# Patient Record
Sex: MALE | Race: WHITE | NOT HISPANIC OR LATINO | Employment: FULL TIME | URBAN - METROPOLITAN AREA
[De-identification: names, ages, dates, MRNs, and addresses within clinical notes are randomized per-mention and may not be internally consistent; named-entity substitution may affect disease eponyms.]

---

## 2022-04-21 ENCOUNTER — OFFICE VISIT (OUTPATIENT)
Dept: CARDIOLOGY | Facility: CLINIC | Age: 22
End: 2022-04-21

## 2022-04-21 VITALS
DIASTOLIC BLOOD PRESSURE: 64 MMHG | OXYGEN SATURATION: 96 % | SYSTOLIC BLOOD PRESSURE: 99 MMHG | HEART RATE: 56 BPM | HEIGHT: 71 IN | BODY MASS INDEX: 37.8 KG/M2 | WEIGHT: 270 LBS

## 2022-04-21 DIAGNOSIS — R55 SYNCOPE AND COLLAPSE: Primary | ICD-10-CM

## 2022-04-21 PROCEDURE — 93000 ELECTROCARDIOGRAM COMPLETE: CPT | Performed by: INTERNAL MEDICINE

## 2022-04-21 PROCEDURE — 99204 OFFICE O/P NEW MOD 45 MIN: CPT | Performed by: INTERNAL MEDICINE

## 2022-04-21 NOTE — PROGRESS NOTES
Encounter Date:04/21/2022      Patient ID: Kurtis Poon is a 21 y.o. male.    Chief Complaint:  Syncope      History of Present Illness  The patient is a pleasant 21-year-old white male presented to the office with history of syncope.  Patient apparently approximately 1 year ago while he was standing in line at department store had syncopal episode starting with lightheadedness.  Patient's bystanders thought he may be having seizure activity with jerking motion while he was on the floor.  EMS were called and was taken to ER and was found to have bradycardia and apparently had 8-second period of asystole in the ER.  Patient apparently has been doing fine since then until recently on 4/3/2022 he had another episode while he was in the restaurant feeling fine and apparently had chipped molar and had terrible pain.  He had similar episode.  Patient was taken to the ER and no specific etiology was found.  Patient did not have any further episodes.    The patient has been without any chest discomfort ,shortness of breath, palpitations, dizziness.  Denies having any headache ,abdominal pain ,nausea, vomiting , diarrhea constipation, loss of weight or loss of appetite.  Denies having any excessive bruising ,hematuria or blood in the stool.    Review of all systems negative except as indicated.    Reviewed ROS.      Assessment and Plan     ]]]]]]]]]]]]]]]]]]]]]  Impression  ===========  - Syncope.  Apparently had 8-second pause while he was in the ER first time.  Patient apparently had seizure activity with first episode.  Possible cardiac seizure.  First episode was approximately a year ago and second episode for 4/3/2022 with following description.    Patient apparently approximately 1 year ago while he was standing in line at department store had syncopal episode starting with lightheadedness.  Patient's bystanders thought he may be having seizure activity with jerking motion while he was on the floor.  EMS were called  and was taken to ER and was found to have bradycardia and apparently had 8-second period of asystole in the ER.  Patient apparently has been doing fine since then until recently on 4/3/2022 he had another episode while he was in the restaurant feeling fine and apparently had chipped molar and had terrible pain.  He had similar episode.  Patient was taken to the ER and no specific etiology was found.  Patient did not have any further episodes.    - Status post hernia repair tubes in the ears    -Family history of coronary artery disease    - Smoker (will review with patient when he returns since he is listed as non-smoker)    - No known allergies  ============  Plan  ===========  Patient has history of syncope.  EKG showed sinus bradycardia.  Patient apparently had a history 8-second pause with the first episode while he was in the ER.  Concerning for cardiac seizure.  Second episode probably vasovagal associated with chipping of molar.  Apparently very painful.  Echocardiogram  30-day event monitor.  Patient may benefit from tilt table test.  Patient may benefit from loop recorder placement if no definite etiology is found with 30-day event monitor.  Follow-up in the office on the same day.  Further plan will depend on patient's progress.  ]]]]]]]]]]]]]]]]]]           Diagnosis Plan   1. Syncope and collapse  Mobile Cardiac Outpatient Telemetry    Adult Transthoracic Echo Complete W/ Cont if Necessary Per Protocol   LAB RESULTS (LAST 7 DAYS)    CBC        BMP        CMP         BNP        TROPONIN        CoAg        Creatinine Clearance  CrCl cannot be calculated (No successful lab value found.).    ABG        Radiology  No radiology results for the last day                The following portions of the patient's history were reviewed and updated as appropriate: allergies, current medications, past family history, past medical history, past social history, past surgical history and problem list.    Review of Systems    Constitutional: Negative for malaise/fatigue.   Cardiovascular: Negative for chest pain, leg swelling, palpitations and syncope.   Respiratory: Negative for shortness of breath.    Skin: Negative for rash.   Gastrointestinal: Negative for nausea and vomiting.   Neurological: Positive for light-headedness. Negative for dizziness and numbness.   All other systems reviewed and are negative.      No current outpatient medications on file.    No Known Allergies    Family History   Problem Relation Age of Onset   • Heart disease Father    • Hypertension Maternal Grandmother    • Heart failure Maternal Grandmother    • Heart disease Maternal Grandmother    • Diabetes type I Maternal Grandmother    • Stroke Maternal Grandmother    • Hypertension Maternal Grandfather    • Diabetes type II Maternal Grandfather    • Diabetes type II Paternal Grandmother    • Hypertension Paternal Grandmother    • Heart disease Paternal Grandmother    • Diabetes type II Paternal Grandfather    • Hypertension Paternal Grandfather        Past Surgical History:   Procedure Laterality Date   • EAR TUBES     • HERNIA REPAIR     • TEAR DUCT SURGERY         History reviewed. No pertinent past medical history.    Family History   Problem Relation Age of Onset   • Heart disease Father    • Hypertension Maternal Grandmother    • Heart failure Maternal Grandmother    • Heart disease Maternal Grandmother    • Diabetes type I Maternal Grandmother    • Stroke Maternal Grandmother    • Hypertension Maternal Grandfather    • Diabetes type II Maternal Grandfather    • Diabetes type II Paternal Grandmother    • Hypertension Paternal Grandmother    • Heart disease Paternal Grandmother    • Diabetes type II Paternal Grandfather    • Hypertension Paternal Grandfather        Social History     Socioeconomic History   • Marital status: Single   Tobacco Use   • Smoking status: Never Smoker   • Smokeless tobacco: Never Used   Vaping Use   • Vaping Use: Every day   •  "Substances: Nicotine, Flavoring   • Devices: [employer] tank   Substance and Sexual Activity   • Alcohol use: Not Currently   • Drug use: Yes     Frequency: 7.0 times per week     Types: Marijuana   • Sexual activity: Defer           ECG 12 Lead    Date/Time: 4/21/2022 3:56 PM  Performed by: Micki Mane MD  Authorized by: Micki Mane MD   Comparison: compared with previous ECG   Comparison to previous ECG: Sinus bradycardia 54/min nonspecific ST-T wave changes right axis deviation no ectopy nonspecific ST-T wave changes.                Objective:       Physical Exam    BP 99/64 (BP Location: Left arm, Patient Position: Sitting, Cuff Size: Large Adult)   Pulse 56   Ht 180.3 cm (71\")   Wt 122 kg (270 lb)   SpO2 96%   BMI 37.66 kg/m²   The patient is alert, oriented and in no distress.    Vital signs as noted above.    Head and neck revealed no carotid bruits or jugular venous distension.  No thyromegaly or lymphadenopathy is present.    Lungs clear.  No wheezing.  Breath sounds are normal bilaterally.    Heart normal first and second heart sounds.  No murmur..  No pericardial rub is present.  No gallop is present.    Abdomen soft and nontender.  No organomegaly is present.    Extremities revealed good peripheral pulses without any pedal edema.    Skin warm and dry.    Musculoskeletal system is grossly normal.    CNS grossly normal.    Reviewed and updated.        "

## 2022-04-29 ENCOUNTER — HOSPITAL ENCOUNTER (OUTPATIENT)
Dept: CARDIOLOGY | Facility: HOSPITAL | Age: 22
Discharge: HOME | End: 2022-04-29
Admitting: INTERNAL MEDICINE

## 2022-04-29 VITALS — WEIGHT: 270 LBS | BODY MASS INDEX: 37.8 KG/M2 | HEIGHT: 71 IN

## 2022-04-29 DIAGNOSIS — R55 SYNCOPE AND COLLAPSE: ICD-10-CM

## 2022-04-29 LAB
BH CV ECHO MEAS - ACS: 1.87 CM
BH CV ECHO MEAS - AO MAX PG: 7.2 MMHG
BH CV ECHO MEAS - AO MEAN PG: 4.4 MMHG
BH CV ECHO MEAS - AO ROOT DIAM: 3.1 CM
BH CV ECHO MEAS - AO V2 MAX: 134.4 CM/SEC
BH CV ECHO MEAS - AO V2 VTI: 34.6 CM
BH CV ECHO MEAS - AVA(I,D): 2.8 CM2
BH CV ECHO MEAS - EDV(CUBED): 94.5 ML
BH CV ECHO MEAS - EDV(MOD-SP4): 114.9 ML
BH CV ECHO MEAS - EF(MOD-SP4): 60.3 %
BH CV ECHO MEAS - ESV(CUBED): 26.6 ML
BH CV ECHO MEAS - ESV(MOD-SP4): 45.6 ML
BH CV ECHO MEAS - FS: 34.5 %
BH CV ECHO MEAS - IVS/LVPW: 0.93 CM
BH CV ECHO MEAS - IVSD: 0.87 CM
BH CV ECHO MEAS - LA DIMENSION: 4.3 CM
BH CV ECHO MEAS - LV MASS(C)D: 135 GRAMS
BH CV ECHO MEAS - LV MAX PG: 5.7 MMHG
BH CV ECHO MEAS - LV MEAN PG: 2.9 MMHG
BH CV ECHO MEAS - LV V1 MAX: 119.5 CM/SEC
BH CV ECHO MEAS - LV V1 VTI: 26.2 CM
BH CV ECHO MEAS - LVIDD: 4.6 CM
BH CV ECHO MEAS - LVIDS: 3 CM
BH CV ECHO MEAS - LVOT AREA: 3.7 CM2
BH CV ECHO MEAS - LVOT DIAM: 2.17 CM
BH CV ECHO MEAS - LVPWD: 0.93 CM
BH CV ECHO MEAS - MV A MAX VEL: 57.7 CM/SEC
BH CV ECHO MEAS - MV DEC SLOPE: 623.8 CM/SEC2
BH CV ECHO MEAS - MV DEC TIME: 0.19 MSEC
BH CV ECHO MEAS - MV E MAX VEL: 117.4 CM/SEC
BH CV ECHO MEAS - MV E/A: 2.03
BH CV ECHO MEAS - MV MAX PG: 5.6 MMHG
BH CV ECHO MEAS - MV MEAN PG: 1.92 MMHG
BH CV ECHO MEAS - MV V2 VTI: 28.8 CM
BH CV ECHO MEAS - MVA(VTI): 3.4 CM2
BH CV ECHO MEAS - PA ACC TIME: 0.13 SEC
BH CV ECHO MEAS - PA PR(ACCEL): 19.8 MMHG
BH CV ECHO MEAS - PA V2 MAX: 100.8 CM/SEC
BH CV ECHO MEAS - PULM A REVS DUR: 0.08 SEC
BH CV ECHO MEAS - PULM A REVS VEL: 24.1 CM/SEC
BH CV ECHO MEAS - PULM DIAS VEL: 49.8 CM/SEC
BH CV ECHO MEAS - PULM S/D: 1.05
BH CV ECHO MEAS - PULM SYS VEL: 52.4 CM/SEC
BH CV ECHO MEAS - RAP SYSTOLE: 3 MMHG
BH CV ECHO MEAS - RV MAX PG: 2.7 MMHG
BH CV ECHO MEAS - RV V1 MAX: 82.4 CM/SEC
BH CV ECHO MEAS - RV V1 VTI: 20.9 CM
BH CV ECHO MEAS - RVDD: 3.1 CM
BH CV ECHO MEAS - RVSP: 12.1 MMHG
BH CV ECHO MEAS - SV(LVOT): 97.3 ML
BH CV ECHO MEAS - SV(MOD-SP4): 69.2 ML
BH CV ECHO MEAS - TR MAX PG: 9.1 MMHG
BH CV ECHO MEAS - TR MAX VEL: 150.8 CM/SEC
LV EF 2D ECHO EST: 60 %
MAXIMAL PREDICTED HEART RATE: 198 BPM
STRESS TARGET HR: 168 BPM

## 2022-04-29 PROCEDURE — 93306 TTE W/DOPPLER COMPLETE: CPT | Performed by: INTERNAL MEDICINE

## 2022-04-29 PROCEDURE — 93306 TTE W/DOPPLER COMPLETE: CPT

## 2022-06-02 ENCOUNTER — OFFICE VISIT (OUTPATIENT)
Dept: CARDIOLOGY | Facility: CLINIC | Age: 22
End: 2022-06-02

## 2022-06-02 VITALS
DIASTOLIC BLOOD PRESSURE: 76 MMHG | WEIGHT: 269 LBS | OXYGEN SATURATION: 97 % | BODY MASS INDEX: 37.66 KG/M2 | HEART RATE: 61 BPM | SYSTOLIC BLOOD PRESSURE: 123 MMHG | HEIGHT: 71 IN

## 2022-06-02 DIAGNOSIS — R00.1 BRADYCARDIA, SINUS: ICD-10-CM

## 2022-06-02 DIAGNOSIS — R55 SYNCOPE AND COLLAPSE: Primary | ICD-10-CM

## 2022-06-02 PROCEDURE — 99214 OFFICE O/P EST MOD 30 MIN: CPT | Performed by: INTERNAL MEDICINE

## 2022-06-02 NOTE — PROGRESS NOTES
Encounter Date:06/02/2022    Last seen 4/21/2022    Patient ID: Kurtis Poon is a 21 y.o. male.    Chief Complaint:  Syncope        History of Present Illness  Patient recently was seen with following history.  Reviewed and updated.  Patient recently had 30-day event monitor.  Patient is asymptomatic since last seen on 4/21/2022    The patient is a pleasant 21-year-old white male presented to the office with history of syncope.  Patient apparently approximately 1 year ago while he was standing in line at [employer] store had syncopal episode starting with lightheadedness.  Patient's bystanders thought he may be having seizure activity with jerking motion while he was on the floor.  EMS were called and was taken to ER and was found to have bradycardia and apparently had 8-second period of asystole in the ER.  Patient apparently has been doing fine since then until recently on 4/3/2022 he had another episode while he was in the restaurant feeling fine and apparently had chipped molar and had terrible pain.  He had similar episode.  Patient was taken to the ER and no specific etiology was found.  Patient did not have any further episodes.     The patient has been without any chest discomfort ,shortness of breath, palpitations, dizziness.  Denies having any headache ,abdominal pain ,nausea, vomiting , diarrhea constipation, loss of weight or loss of appetite.  Denies having any excessive bruising ,hematuria or blood in the stool.     Review of all systems negative except as indicated.     Reviewed ROS.        Assessment and Plan      ]]]]]]]]]]]]]]]]]]]]]  Impression  ===========  - Syncope.  Apparently had 8-second pause while he was in the ER first time.  Patient apparently had seizure activity with first episode.  Possible cardiac seizure.  First episode was approximately a year ago and second episode for 4/3/2022 with following description.     Patient apparently approximately 1 year ago while he was standing in  line at department store had syncopal episode starting with lightheadedness.  Patient's bystanders thought he may be having seizure activity with jerking motion while he was on the floor.  EMS were called and was taken to ER and was found to have bradycardia and apparently had 8-second period of asystole in the ER.  Patient apparently has been doing fine since then until recently on 4/3/2022 he had another episode while he was in the restaurant feeling fine and apparently had chipped molar and had terrible pain.  He had similar episode.  Patient was taken to the ER and no specific etiology was found.  Patient did not have any further episodes.    Echocardiogram-4/29/2022-normal    30-day event monitor 4/29/2022  Abnormal Holter monitor with significant sinus bradycardia with heart rates as low as 34/min.  1 episode of second-degree AV block (single cycle) was noted.  Have contacted patient and he is not having any significant symptoms at this time.  Patient has an appointment on 6/2/2022.  Will discuss with him the options including consideration for EP consultation/pacemaker.  Patient to come to the ER if he has any symptoms.    - Status post hernia repair tubes in the ears     -Family history of coronary artery disease     - Smoker (will review with patient when he returns since he is listed as non-smoker)     - No known allergies  ============  Plan  ===========  Patient has history of syncope.  EKG showed sinus bradycardia.  Patient apparently had a history 8-second pause with the first episode while he was in the ER.  Concerning for cardiac seizure.  Second episode probably vasovagal associated with chipping of molar.  Apparently very painful.  Patient did not have any further episodes since last visit.    Echocardiogram-normal 4/29/2022  30-day event monitor.-Abnormal as above  Patient has significant sinus bradycardia and occasional AV block.  Patient may benefit from permanent pacemaker implantation.  Will  have second opinion from electrophysiologist prior to considering pacemaker implantation especially given his age.  Patient is not on any medications to cause bradycardia.    Hold off on tilt table test.  Electrophysiology consultation soon possible.    Follow-up in the office in 3 weeks.  Consider pacemaker implantation depending on electrophysiologist opinion.  I had a lengthy discussion with patient and patient's mother regarding present considerations.  Further plan will depend on patient's progress.  ]]]]]]]]]]]]]]]]]]               Diagnosis Plan   1. Syncope and collapse     2. Bradycardia, sinus     LAB RESULTS (LAST 7 DAYS)    CBC        BMP        CMP         BNP        TROPONIN        CoAg        Creatinine Clearance  CrCl cannot be calculated (No successful lab value found.).    ABG        Radiology  No radiology results for the last day                The following portions of the patient's history were reviewed and updated as appropriate: allergies, current medications, past family history, past medical history, past social history, past surgical history and problem list.    Review of Systems   Constitutional: Negative for malaise/fatigue.   Cardiovascular: Negative for chest pain, leg swelling, palpitations and syncope.   Respiratory: Negative for shortness of breath.    Skin: Negative for rash.   Gastrointestinal: Negative for nausea and vomiting.   Neurological: Negative for dizziness, light-headedness and numbness.   All other systems reviewed and are negative.      No current outpatient medications on file.    No Known Allergies    Family History   Problem Relation Age of Onset   • Heart disease Father    • Hypertension Maternal Grandmother    • Heart failure Maternal Grandmother    • Heart disease Maternal Grandmother    • Diabetes type I Maternal Grandmother    • Stroke Maternal Grandmother    • Hypertension Maternal Grandfather    • Diabetes type II Maternal Grandfather    • Diabetes type II  "Paternal Grandmother    • Hypertension Paternal Grandmother    • Heart disease Paternal Grandmother    • Diabetes type II Paternal Grandfather    • Hypertension Paternal Grandfather        Past Surgical History:   Procedure Laterality Date   • EAR TUBES     • HERNIA REPAIR     • TEAR DUCT SURGERY         History reviewed. No pertinent past medical history.    Family History   Problem Relation Age of Onset   • Heart disease Father    • Hypertension Maternal Grandmother    • Heart failure Maternal Grandmother    • Heart disease Maternal Grandmother    • Diabetes type I Maternal Grandmother    • Stroke Maternal Grandmother    • Hypertension Maternal Grandfather    • Diabetes type II Maternal Grandfather    • Diabetes type II Paternal Grandmother    • Hypertension Paternal Grandmother    • Heart disease Paternal Grandmother    • Diabetes type II Paternal Grandfather    • Hypertension Paternal Grandfather        Social History     Socioeconomic History   • Marital status: Single   Tobacco Use   • Smoking status: Never Smoker   • Smokeless tobacco: Never Used   Vaping Use   • Vaping Use: Every day   • Substances: Nicotine, Flavoring   • Devices: Refillable tank   Substance and Sexual Activity   • Alcohol use: Not Currently   • Drug use: Yes     Frequency: 7.0 times per week     Types: Marijuana   • Sexual activity: Defer         Procedures      Objective:       Physical Exam    /76 (BP Location: Left arm, Patient Position: Sitting, Cuff Size: Large Adult)   Pulse 61   Ht 180.3 cm (71\")   Wt 122 kg (269 lb)   SpO2 97%   BMI 37.52 kg/m²   The patient is alert, oriented and in no distress.    Vital signs as noted above.    Head and neck revealed no carotid bruits or jugular venous distension.  No thyromegaly or lymphadenopathy is present.    Lungs clear.  No wheezing.  Breath sounds are normal bilaterally.    Heart normal first and second heart sounds.  No murmur..  No pericardial rub is present.  No gallop is " present.    Abdomen soft and nontender.  No organomegaly is present.    Extremities revealed good peripheral pulses without any pedal edema.    Skin warm and dry.    Musculoskeletal system is grossly normal.    CNS grossly normal.    Reviewed and updated.

## 2022-06-14 ENCOUNTER — OFFICE VISIT (OUTPATIENT)
Dept: CARDIOLOGY | Facility: CLINIC | Age: 22
End: 2022-06-14

## 2022-06-14 VITALS
WEIGHT: 273 LBS | HEART RATE: 62 BPM | DIASTOLIC BLOOD PRESSURE: 66 MMHG | SYSTOLIC BLOOD PRESSURE: 115 MMHG | BODY MASS INDEX: 38.22 KG/M2 | HEIGHT: 71 IN | OXYGEN SATURATION: 96 %

## 2022-06-14 DIAGNOSIS — R00.1 BRADYCARDIA, SINUS: Primary | ICD-10-CM

## 2022-06-14 PROCEDURE — 99213 OFFICE O/P EST LOW 20 MIN: CPT | Performed by: INTERNAL MEDICINE

## 2022-06-14 NOTE — PROGRESS NOTES
HP      Name: Kurtis Poon ADMIT: (Not on file)   : 2000  PCP: Doc Keenan MD    MRN: [id number] LOS: 0 days   AGE/SEX: 21 y.o. male  ROOM: Room/bed info not found     Chief Complaint   Patient presents with   • Consult       Subjective        History of present illness  Kurtis Poon is a 21-year-old male patient who is here for evaluation of syncope and bradycardia.  Patient had a syncopal episode about a year ago while he was standing in line at the department store, at that time he was taken to the ER and bystanders said that he was having seizure-like activity.  In the ER he was told that he had 8-second pause.  More recently on 4/3/2022, patient was at a restaurant and he had another episode of syncope while eating his food.  Just before that he had chipped his molar but he was not in any significant pain.  Patient was evaluated by an event monitor after the event which did show episodes of bradycardia heart rate in the 30s.  He is here to discuss about possible need for pacemaker.    History reviewed. No pertinent past medical history.  Past Surgical History:   Procedure Laterality Date   • EAR TUBES     • HERNIA REPAIR     • TEAR DUCT SURGERY       Family History   Problem Relation Age of Onset   • Heart disease Father    • Hypertension Maternal Grandmother    • Heart failure Maternal Grandmother    • Heart disease Maternal Grandmother    • Diabetes type I Maternal Grandmother    • Stroke Maternal Grandmother    • Hypertension Maternal Grandfather    • Diabetes type II Maternal Grandfather    • Diabetes type II Paternal Grandmother    • Hypertension Paternal Grandmother    • Heart disease Paternal Grandmother    • Diabetes type II Paternal Grandfather    • Hypertension Paternal Grandfather      Social History     Tobacco Use   • Smoking status: Never Smoker   • Smokeless tobacco: Never Used   Vaping Use   • Vaping Use: Former   • Substances: Nicotine, Flavoring   • Devices: [employer] tank    Substance Use Topics   • Alcohol use: Not Currently   • Drug use: Yes     Frequency: 7.0 times per week     Types: Marijuana     (Not in a hospital admission)    Allergies:  Patient has no known allergies.    Review of systems    Constitutional: Negative.    Respiratory and cardiovascular: As detailed in HPI section.  Gastrointestinal: Negative for constipation, nausea and vomiting negative for abdominal distention, abdominal pain and diarrhea.   Genitourinary: Negative for difficulty urinating and flank pain.   Musculoskeletal: Negative for arthralgias, joint swelling and myalgias.   Skin: Negative for color change, rash and wound.   Neurological: Negative for dizziness, syncope, weakness and headaches.   Hematological: Negative for adenopathy.   Psychiatric/Behavioral: Negative for confusion.   All other systems reviewed and are negative.    Physical Exam  VITALS REVIEWED    General:      well developed, in no acute distress.    Head:      normocephalic and atraumatic.    Eyes:      PERRL/EOM intact, conjunctiva and sclera clear with out nystagmus.    Neck:      no masses, thyromegaly,  trachea central with normal respiratory effort and PMI displaced laterally  Lungs:      Clear to auscultation bilaterally  Heart:       regular rate and rhythm, no murmur  Msk:      no deformity or scoliosis noted of thoracic or lumbar spine.    Pulses:      pulses normal in all 4 extremities.    Extremities:       no lower extremity edema  Neurologic:      no focal deficits.   alert oriented x3  Skin:      intact without lesions or rashes.    Psych:      alert and cooperative; normal mood and affect; normal attention span and concentration.      Result Review :               Pertinent cardiac workup    1. Echo 4/29/2022 ejection fraction 60%  2. Event monitor 26 days starting on 4/29/2022 sinus rhythm, episodes of sinus bradycardia heart rate in the 30s, often occurring in the afternoons when the patient is  awake.      Procedures        Assessment and Plan      Kurtis Poon is a 21-year-old male patient who has been experiencing bradycardia and has had 2 syncopal episodes so far, is here today to discuss about possibility of a pacemaker.  There is clear evidence of sinus bradycardia as seen on the event monitor, he is experiencing low heart rates even when he is awake.  Although while he was wearing the monitor patient did not experience much symptoms, still with history of syncope and documented bradycardia in association can be made.  Due to his young age however it is imperative to rule out reversible causes.  In March 2021, a TSH level was 10 which puts him in hypothyroid range.  I would like to repeat this along with full thyroid panel and if hypothyroid, first we will proceed with thyroid replacement to see if that helps with his bradycardia.  If however a pacemaker is needed, I would advocate for a single-chamber atrial pacemaker since he does have good AV conduction and the last hardware in the heart, the better due to his very young age.  The plan was discussed with the patient and his family and they are agreeable.    Diagnoses and all orders for this visit:    1. Bradycardia, sinus (Primary)  -     Thyroid Panel With TSH; Future           No follow-ups on file.  Patient was given instructions and counseling regarding his condition or for health maintenance advice. Please see specific information pulled into the AVS if appropriate.

## 2022-06-23 ENCOUNTER — OFFICE VISIT (OUTPATIENT)
Dept: CARDIOLOGY | Facility: CLINIC | Age: 22
End: 2022-06-23

## 2022-06-23 VITALS
HEART RATE: 70 BPM | BODY MASS INDEX: 38.22 KG/M2 | SYSTOLIC BLOOD PRESSURE: 122 MMHG | DIASTOLIC BLOOD PRESSURE: 79 MMHG | WEIGHT: 273 LBS | HEIGHT: 71 IN | OXYGEN SATURATION: 99 %

## 2022-06-23 DIAGNOSIS — R00.1 BRADYCARDIA, SINUS: ICD-10-CM

## 2022-06-23 DIAGNOSIS — I49.5 SICK SINUS SYNDROME: Primary | ICD-10-CM

## 2022-06-23 DIAGNOSIS — R55 SYNCOPE AND COLLAPSE: ICD-10-CM

## 2022-06-23 PROCEDURE — 99215 OFFICE O/P EST HI 40 MIN: CPT | Performed by: INTERNAL MEDICINE

## 2022-06-23 NOTE — PROGRESS NOTES
Encounter Date:06/23/2022  3-week follow-up      Patient ID: Kurtis Poon is a 21 y.o. male.    Chief Complaint:    Syncope        History of Present Illness  Patient recently was seen with following history.  Reviewed and updated.  Patient recently had 30-day event monitor.  Patient is asymptomatic since last seen on 4/21/2022     The patient is a pleasant 21-year-old white male presented to the office with history of syncope.  Patient apparently approximately 1 year ago while he was standing in line at [employer] store had syncopal episode starting with lightheadedness.  Patient's bystanders thought he may be having seizure activity with jerking motion while he was on the floor.  EMS were called and was taken to ER and was found to have bradycardia and apparently had 8-second period of asystole in the ER.  Patient apparently has been doing fine since then until recently on 4/3/2022 he had another episode while he was in the restaurant feeling fine and apparently had chipped molar and had terrible pain.  He had similar episode.  Patient was taken to the ER and no specific etiology was found.  Patient did not have any further episodes.     The patient has been without any chest discomfort ,shortness of breath, palpitations, dizziness.  Denies having any headache ,abdominal pain ,nausea, vomiting , diarrhea constipation, loss of weight or loss of appetite.  Denies having any excessive bruising ,hematuria or blood in the stool.     Review of all systems negative except as indicated.     Reviewed ROS.        Assessment and Plan      ]]]]]]]]]]]]]]]]]]]]]  Impression  ===========  - Syncope.  Apparently had 8-second pause while he was in the ER first time.  Patient apparently had seizure activity with first episode.  Possible cardiac seizure.  First episode was approximately a year ago and second episode for 4/3/2022 with following description.     Patient apparently approximately 1 year ago while he was standing in  line at department store had syncopal episode starting with lightheadedness.  Patient's bystanders thought he may be having seizure activity with jerking motion while he was on the floor.  EMS were called and was taken to ER and was found to have bradycardia and apparently had 8-second period of asystole in the ER.  Patient apparently has been doing fine since then until recently on 4/3/2022 he had another episode while he was in the restaurant feeling fine and apparently had chipped molar and had terrible pain.  He had similar episode.  Patient was taken to the ER and no specific etiology was found.  Patient did not have any further episodes.     Echocardiogram-4/29/2022-normal     30-day event monitor 4/29/2022  Abnormal Holter monitor with significant sinus bradycardia with heart rates as low as 34/min.  1 episode of second-degree AV block (single cycle) was noted.  Have contacted patient and he is not having any significant symptoms at this time.  Patient has an appointment on 6/2/2022.  Will discuss with him the options including consideration for EP consultation/pacemaker.  Patient to come to the ER if he has any symptoms.     - Status post hernia repair tubes in the ears     -Family history of coronary artery disease     - Smoker (will review with patient when he returns since he is listed as non-smoker)     - No known allergies  ============  Plan  ===========  Patient has history of syncope.  EKG showed sinus bradycardia.  Patient apparently had a history 8-second pause with the first episode while he was in the ER.  Concerning for cardiac seizure.  Second episode probably vasovagal associated with chipping of molar.  Apparently very painful.  Patient did not have any further episodes since last visit.     Echocardiogram-normal 4/29/2022  30-day event monitor.-Abnormal as above  Patient has significant sinus bradycardia and occasional AV block.  Patient may benefit from permanent pacemaker  implantation.  Will have second opinion from electrophysiologist prior to considering pacemaker implantation especially given his age.  Patient is not on any medications to cause bradycardia.     Electrophysiologist Dr. Velasquez's consultation is appreciated.  I had multiple discussions with him regarding coordination of care for him.  Patient had TSH panel which was within normal range.  Dr. Velasquez suggested permanent pacemaker implantation.    Had a lengthy discussion with patient and patient's father and mother regarding the options.  They are agreeable with and understand fully the reason for pacemaker implantation.  I have discussed with them about the option of having atrial pacemaker only and still dual-chamber pacemaker.  They would prefer having dual-chamber pacemaker rather than a single atrial pacemaker.  Risks and benefits pros and cons of the procedure including infection bleeding blood clot pneumothorax anesthetic risk etc. were discussed with patient and patient's parents.    Also had a lengthy discussion with him expectations activities limitations etc. now and in the future.  Also they fully understand that patient may need several pacemaker pulse and due to replacements in his lifetime.    They prefer to have it scheduled on 6/29/2022.    Further plan will depend on patient's progress.  ]]]]]]]]]]]]]]]]]]                    Diagnosis Plan   1. Sick sinus syndrome (HCC)  Case Request Cath Lab: Pacemaker DC new    CBC (No Diff)    Basic Metabolic Panel    Protime-INR    aPTT    ECG 12 Lead    MRSA Screen Culture (Outpatient) - Swab, Nares   2. Bradycardia, sinus  Case Request Cath Lab: Pacemaker DC new    CBC (No Diff)    Basic Metabolic Panel    Protime-INR    aPTT    ECG 12 Lead    MRSA Screen Culture (Outpatient) - Swab, Nares   3. Syncope and collapse  Case Request Cath Lab: Pacemaker DC new    CBC (No Diff)    Basic Metabolic Panel    Protime-INR    aPTT    ECG 12 Lead    MRSA Screen  Culture (Outpatient) - Swab, Nares   LAB RESULTS (LAST 7 DAYS)    CBC        BMP        CMP         BNP        TROPONIN        CoAg        Creatinine Clearance  CrCl cannot be calculated (No successful lab value found.).    ABG        Radiology  No radiology results for the last day                The following portions of the patient's history were reviewed and updated as appropriate: allergies, current medications, past family history, past medical history, past social history, past surgical history and problem list.    Review of Systems   Constitutional: Negative for malaise/fatigue.   Cardiovascular: Negative for chest pain, leg swelling, palpitations and syncope.   Respiratory: Negative for shortness of breath.    Skin: Negative for rash.   Gastrointestinal: Negative for nausea and vomiting.   Neurological: Negative for dizziness, light-headedness and numbness.   All other systems reviewed and are negative.      No current outpatient medications on file.    No Known Allergies    Family History   Problem Relation Age of Onset   • Heart disease Father    • Hypertension Maternal Grandmother    • Heart failure Maternal Grandmother    • Heart disease Maternal Grandmother    • Diabetes type I Maternal Grandmother    • Stroke Maternal Grandmother    • Hypertension Maternal Grandfather    • Diabetes type II Maternal Grandfather    • Diabetes type II Paternal Grandmother    • Hypertension Paternal Grandmother    • Heart disease Paternal Grandmother    • Diabetes type II Paternal Grandfather    • Hypertension Paternal Grandfather        Past Surgical History:   Procedure Laterality Date   • EAR TUBES     • HERNIA REPAIR     • TEAR DUCT SURGERY         History reviewed. No pertinent past medical history.    Family History   Problem Relation Age of Onset   • Heart disease Father    • Hypertension Maternal Grandmother    • Heart failure Maternal Grandmother    • Heart disease Maternal Grandmother    • Diabetes type I  "Maternal Grandmother    • Stroke Maternal Grandmother    • Hypertension Maternal Grandfather    • Diabetes type II Maternal Grandfather    • Diabetes type II Paternal Grandmother    • Hypertension Paternal Grandmother    • Heart disease Paternal Grandmother    • Diabetes type II Paternal Grandfather    • Hypertension Paternal Grandfather        Social History     Socioeconomic History   • Marital status: Single   Tobacco Use   • Smoking status: Never Smoker   • Smokeless tobacco: Never Used   Vaping Use   • Vaping Use: Former   • Substances: Nicotine, Flavoring   • Devices: Refillable tank   Substance and Sexual Activity   • Alcohol use: Not Currently   • Drug use: Yes     Frequency: 7.0 times per week     Types: Marijuana   • Sexual activity: Defer         Procedures      Objective:       Physical Exam    /79 (BP Location: Left arm, Patient Position: Sitting, Cuff Size: Large Adult)   Pulse 70   Ht 180.3 cm (71\")   Wt 124 kg (273 lb)   SpO2 99%   BMI 38.08 kg/m²   The patient is alert, oriented and in no distress.    Vital signs as noted above.  Exogenous obesity (BMI 38)    Head and neck revealed no carotid bruits or jugular venous distension.  No thyromegaly or lymphadenopathy is present.    Lungs clear.  No wheezing.  Breath sounds are normal bilaterally.    Heart normal first and second heart sounds.  No murmur..  No pericardial rub is present.  No gallop is present.    Abdomen soft and nontender.  No organomegaly is present.    Extremities revealed good peripheral pulses without any pedal edema.    Skin warm and dry.    Musculoskeletal system is grossly normal.    CNS grossly normal.    Reviewed and updated.        "

## 2022-06-29 ENCOUNTER — HOSPITAL ENCOUNTER (OUTPATIENT)
Dept: CARDIOLOGY | Facility: HOSPITAL | Age: 22
Discharge: HOME | End: 2022-06-29

## 2022-06-29 ENCOUNTER — APPOINTMENT (OUTPATIENT)
Dept: GENERAL RADIOLOGY | Facility: HOSPITAL | Age: 22
End: 2022-06-29

## 2022-06-29 ENCOUNTER — LAB (OUTPATIENT)
Dept: LAB | Facility: HOSPITAL | Age: 22
End: 2022-06-29

## 2022-06-29 ENCOUNTER — HOSPITAL ENCOUNTER (OUTPATIENT)
Facility: HOSPITAL | Age: 22
Discharge: HOME | End: 2022-06-30
Attending: INTERNAL MEDICINE | Admitting: INTERNAL MEDICINE

## 2022-06-29 DIAGNOSIS — I49.5 SICK SINUS SYNDROME: ICD-10-CM

## 2022-06-29 DIAGNOSIS — R00.1 BRADYCARDIA, SINUS: ICD-10-CM

## 2022-06-29 DIAGNOSIS — R55 SYNCOPE AND COLLAPSE: ICD-10-CM

## 2022-06-29 LAB
ANION GAP SERPL CALCULATED.3IONS-SCNC: 10 MMOL/L (ref 5–15)
APTT PPP: 29.6 SECONDS (ref 24–31)
BUN SERPL-MCNC: 20 MG/DL (ref 6–20)
BUN/CREAT SERPL: 20.4 (ref 7–25)
CALCIUM SPEC-SCNC: 9 MG/DL (ref 8.6–10.5)
CHLORIDE SERPL-SCNC: 102 MMOL/L (ref 98–107)
CO2 SERPL-SCNC: 28 MMOL/L (ref 22–29)
CREAT SERPL-MCNC: 0.98 MG/DL (ref 0.76–1.27)
DEPRECATED RDW RBC AUTO: 41.6 FL (ref 37–54)
EGFRCR SERPLBLD CKD-EPI 2021: 111.8 ML/MIN/1.73
ERYTHROCYTE [DISTWIDTH] IN BLOOD BY AUTOMATED COUNT: 14.1 % (ref 12.3–15.4)
GLUCOSE SERPL-MCNC: 84 MG/DL (ref 65–99)
HCT VFR BLD AUTO: 43.3 % (ref 37.5–51)
HGB BLD-MCNC: 14.4 G/DL (ref 13–17.7)
INR PPP: 1.22 (ref 0.93–1.1)
MCH RBC QN AUTO: 28.1 PG (ref 26.6–33)
MCHC RBC AUTO-ENTMCNC: 33.4 G/DL (ref 31.5–35.7)
MCV RBC AUTO: 84.3 FL (ref 79–97)
MRSA DNA SPEC QL NAA+PROBE: NORMAL
PLATELET # BLD AUTO: 225 10*3/MM3 (ref 140–450)
PMV BLD AUTO: 9.5 FL (ref 6–12)
POTASSIUM SERPL-SCNC: 4.2 MMOL/L (ref 3.5–5.2)
PROTHROMBIN TIME: 12.4 SECONDS (ref 9.6–11.7)
QT INTERVAL: 436 MS
RBC # BLD AUTO: 5.13 10*6/MM3 (ref 4.14–5.8)
SARS-COV-2 RNA RESP QL NAA+PROBE: NOT DETECTED
SODIUM SERPL-SCNC: 140 MMOL/L (ref 136–145)
WBC NRBC COR # BLD: 7.7 10*3/MM3 (ref 3.4–10.8)

## 2022-06-29 PROCEDURE — 33208 INSRT HEART PM ATRIAL & VENT: CPT | Performed by: INTERNAL MEDICINE

## 2022-06-29 PROCEDURE — C1894 INTRO/SHEATH, NON-LASER: HCPCS | Performed by: INTERNAL MEDICINE

## 2022-06-29 PROCEDURE — 85610 PROTHROMBIN TIME: CPT

## 2022-06-29 PROCEDURE — 99152 MOD SED SAME PHYS/QHP 5/>YRS: CPT | Performed by: INTERNAL MEDICINE

## 2022-06-29 PROCEDURE — C1785 PMKR, DUAL, RATE-RESP: HCPCS | Performed by: INTERNAL MEDICINE

## 2022-06-29 PROCEDURE — 85027 COMPLETE CBC AUTOMATED: CPT

## 2022-06-29 PROCEDURE — U0003 INFECTIOUS AGENT DETECTION BY NUCLEIC ACID (DNA OR RNA); SEVERE ACUTE RESPIRATORY SYNDROME CORONAVIRUS 2 (SARS-COV-2) (CORONAVIRUS DISEASE [COVID-19]), AMPLIFIED PROBE TECHNIQUE, MAKING USE OF HIGH THROUGHPUT TECHNOLOGIES AS DESCRIBED BY CMS-2020-01-R: HCPCS

## 2022-06-29 PROCEDURE — 0 IOPAMIDOL PER 1 ML: Performed by: INTERNAL MEDICINE

## 2022-06-29 PROCEDURE — 93010 ELECTROCARDIOGRAM REPORT: CPT | Performed by: INTERNAL MEDICINE

## 2022-06-29 PROCEDURE — 80048 BASIC METABOLIC PNL TOTAL CA: CPT

## 2022-06-29 PROCEDURE — 25010000002 FENTANYL CITRATE (PF) 50 MCG/ML SOLUTION: Performed by: INTERNAL MEDICINE

## 2022-06-29 PROCEDURE — 99153 MOD SED SAME PHYS/QHP EA: CPT | Performed by: INTERNAL MEDICINE

## 2022-06-29 PROCEDURE — C1898 LEAD, PMKR, OTHER THAN TRANS: HCPCS | Performed by: INTERNAL MEDICINE

## 2022-06-29 PROCEDURE — 85730 THROMBOPLASTIN TIME PARTIAL: CPT

## 2022-06-29 PROCEDURE — 25010000002 VANCOMYCIN 10 G RECONSTITUTED SOLUTION: Performed by: INTERNAL MEDICINE

## 2022-06-29 PROCEDURE — 36415 COLL VENOUS BLD VENIPUNCTURE: CPT

## 2022-06-29 PROCEDURE — 25010000002 MIDAZOLAM PER 1 MG: Performed by: INTERNAL MEDICINE

## 2022-06-29 PROCEDURE — 87641 MR-STAPH DNA AMP PROBE: CPT

## 2022-06-29 PROCEDURE — 71045 X-RAY EXAM CHEST 1 VIEW: CPT

## 2022-06-29 PROCEDURE — 93005 ELECTROCARDIOGRAM TRACING: CPT | Performed by: INTERNAL MEDICINE

## 2022-06-29 PROCEDURE — G0378 HOSPITAL OBSERVATION PER HR: HCPCS

## 2022-06-29 PROCEDURE — C9803 HOPD COVID-19 SPEC COLLECT: HCPCS

## 2022-06-29 DEVICE — PACE/SENSE LEAD
Type: IMPLANTABLE DEVICE | Site: HEART | Status: FUNCTIONAL
Brand: INGEVITY™+

## 2022-06-29 DEVICE — PACEMAKER
Type: IMPLANTABLE DEVICE | Site: HEART | Status: FUNCTIONAL
Brand: ACCOLADE™ MRI DR

## 2022-06-29 RX ORDER — SODIUM CHLORIDE 0.9 % (FLUSH) 0.9 %
10 SYRINGE (ML) INJECTION EVERY 12 HOURS SCHEDULED
Status: DISCONTINUED | OUTPATIENT
Start: 2022-06-29 | End: 2022-06-30 | Stop reason: HOSPADM

## 2022-06-29 RX ORDER — ACETAMINOPHEN 325 MG/1
650 TABLET ORAL EVERY 4 HOURS PRN
Status: DISCONTINUED | OUTPATIENT
Start: 2022-06-29 | End: 2022-06-30 | Stop reason: HOSPADM

## 2022-06-29 RX ORDER — FENTANYL CITRATE 50 UG/ML
INJECTION, SOLUTION INTRAMUSCULAR; INTRAVENOUS AS NEEDED
Status: DISCONTINUED | OUTPATIENT
Start: 2022-06-29 | End: 2022-06-29 | Stop reason: HOSPADM

## 2022-06-29 RX ORDER — IBUPROFEN 400 MG/1
400 TABLET ORAL EVERY 6 HOURS PRN
Status: DISCONTINUED | OUTPATIENT
Start: 2022-06-29 | End: 2022-06-30 | Stop reason: HOSPADM

## 2022-06-29 RX ORDER — SODIUM CHLORIDE 0.9 % (FLUSH) 0.9 %
10 SYRINGE (ML) INJECTION AS NEEDED
Status: DISCONTINUED | OUTPATIENT
Start: 2022-06-29 | End: 2022-06-30 | Stop reason: HOSPADM

## 2022-06-29 RX ORDER — ACETAMINOPHEN 650 MG/1
650 SUPPOSITORY RECTAL EVERY 4 HOURS PRN
Status: DISCONTINUED | OUTPATIENT
Start: 2022-06-29 | End: 2022-06-30 | Stop reason: HOSPADM

## 2022-06-29 RX ORDER — MIDAZOLAM HYDROCHLORIDE 1 MG/ML
INJECTION INTRAMUSCULAR; INTRAVENOUS AS NEEDED
Status: DISCONTINUED | OUTPATIENT
Start: 2022-06-29 | End: 2022-06-29 | Stop reason: HOSPADM

## 2022-06-29 RX ORDER — SODIUM CHLORIDE 9 MG/ML
30 INJECTION, SOLUTION INTRAVENOUS CONTINUOUS
Status: DISCONTINUED | OUTPATIENT
Start: 2022-06-29 | End: 2022-06-30 | Stop reason: HOSPADM

## 2022-06-29 RX ORDER — LIDOCAINE HYDROCHLORIDE AND EPINEPHRINE BITARTRATE 20; .01 MG/ML; MG/ML
INJECTION, SOLUTION SUBCUTANEOUS AS NEEDED
Status: DISCONTINUED | OUTPATIENT
Start: 2022-06-29 | End: 2022-06-29 | Stop reason: HOSPADM

## 2022-06-29 RX ADMIN — Medication 10 ML: at 21:25

## 2022-06-29 RX ADMIN — VANCOMYCIN HYDROCHLORIDE 1500 MG: 10 INJECTION, POWDER, LYOPHILIZED, FOR SOLUTION INTRAVENOUS at 07:56

## 2022-06-29 RX ADMIN — SODIUM CHLORIDE 30 ML/HR: 9 INJECTION, SOLUTION INTRAVENOUS at 06:48

## 2022-06-30 VITALS
DIASTOLIC BLOOD PRESSURE: 68 MMHG | TEMPERATURE: 97.8 F | SYSTOLIC BLOOD PRESSURE: 112 MMHG | OXYGEN SATURATION: 97 % | HEART RATE: 64 BPM | WEIGHT: 264.99 LBS | RESPIRATION RATE: 14 BRPM | BODY MASS INDEX: 37.94 KG/M2 | HEIGHT: 70 IN

## 2022-06-30 PROCEDURE — 99024 POSTOP FOLLOW-UP VISIT: CPT | Performed by: INTERNAL MEDICINE

## 2022-06-30 PROCEDURE — 25010000002 VANCOMYCIN 1 G RECONSTITUTED SOLUTION 1 EACH VIAL: Performed by: INTERNAL MEDICINE

## 2022-06-30 PROCEDURE — G0378 HOSPITAL OBSERVATION PER HR: HCPCS

## 2022-06-30 RX ADMIN — VANCOMYCIN HYDROCHLORIDE 1000 MG: 1 INJECTION, POWDER, LYOPHILIZED, FOR SOLUTION INTRAVENOUS at 06:34

## 2022-06-30 RX ADMIN — IBUPROFEN 400 MG: 400 TABLET, FILM COATED ORAL at 04:38

## 2022-07-12 ENCOUNTER — CLINICAL SUPPORT NO REQUIREMENTS (OUTPATIENT)
Dept: CARDIOLOGY | Facility: CLINIC | Age: 22
End: 2022-07-12

## 2022-07-12 ENCOUNTER — OFFICE VISIT (OUTPATIENT)
Dept: CARDIOLOGY | Facility: CLINIC | Age: 22
End: 2022-07-12

## 2022-07-12 VITALS
HEART RATE: 69 BPM | BODY MASS INDEX: 39.94 KG/M2 | SYSTOLIC BLOOD PRESSURE: 112 MMHG | DIASTOLIC BLOOD PRESSURE: 68 MMHG | WEIGHT: 279 LBS | HEIGHT: 70 IN | OXYGEN SATURATION: 98 %

## 2022-07-12 DIAGNOSIS — Z95.0 STATUS POST PLACEMENT OF CARDIAC PACEMAKER: Primary | ICD-10-CM

## 2022-07-12 DIAGNOSIS — R00.1 BRADYCARDIA, SINUS: ICD-10-CM

## 2022-07-12 DIAGNOSIS — I49.5 SICK SINUS SYNDROME: ICD-10-CM

## 2022-07-12 DIAGNOSIS — R55 SYNCOPE AND COLLAPSE: ICD-10-CM

## 2022-07-12 DIAGNOSIS — Z95.0 PACEMAKER: ICD-10-CM

## 2022-07-12 DIAGNOSIS — R00.1 BRADYCARDIA, SINUS: Primary | ICD-10-CM

## 2022-07-12 PROCEDURE — 93280 PM DEVICE PROGR EVAL DUAL: CPT | Performed by: INTERNAL MEDICINE

## 2022-07-12 PROCEDURE — 99024 POSTOP FOLLOW-UP VISIT: CPT | Performed by: INTERNAL MEDICINE

## 2022-07-12 NOTE — PROGRESS NOTES
Encounter Date:07/12/2022      Patient ID: Kurtis Poon is a 21 y.o. male.    Chief Complaint:  Syncope  Sick sinus syndrome  Sinus bradycardia  Status post pacemaker        History of Present Illness  Patient recently had permanent dual-chamber pacemaker implantation and was released home.    Since I have last seen, the patient has been without any chest discomfort ,shortness of breath, palpitations, dizziness or syncope.  Denies having any headache ,abdominal pain ,nausea, vomiting , diarrhea constipation, loss of weight or loss of appetite.  Denies having any excessive bruising ,hematuria or blood in the stool.    Review of all systems negative except as indicated.    Reviewed ROS.  Assessment and Plan      ]]]]]]]]]]]]]]]]]]]]]  Impression  ===========  Status post permanent dual-chamber pacemaker implantation ([employer] MRI compatible) 6/29/2022  Both leads are active-fixation leads.    - History of syncope.  Apparently had 8-second pause while he was in the ER first time.  Patient apparently had seizure activity with first episode.  Possible cardiac seizure.  First episode was approximately a year ago and second episode for 4/3/2022 with following description.     Patient apparently approximately 1 year ago while he was standing in line at department store had syncopal episode starting with lightheadedness.  Patient's bystanders thought he may be having seizure activity with jerking motion while he was on the floor.  EMS were called and was taken to ER and was found to have bradycardia and apparently had 8-second period of asystole in the ER.  Patient apparently has been doing fine since then until recently on 4/3/2022 he had another episode while he was in the restaurant feeling fine and apparently had chipped molar and had terrible pain.  He had similar episode.  Patient was taken to the ER and no specific etiology was found.  Patient did not have any further  episodes.     Echocardiogram-4/29/2022-normal     30-day event monitor 4/29/2022  Abnormal Holter monitor with significant sinus bradycardia with heart rates as low as 34/min.  1 episode of second-degree AV block (single cycle) was noted.  Have contacted patient and he is not having any significant symptoms at this time.  Patient has an appointment on 6/2/2022.  Will discuss with him the options including consideration for EP consultation/pacemaker.  Patient to come to the ER if he has any symptoms.     - Status post hernia repair tubes in the ears     -Family history of coronary artery disease     - Smoker (will review with patient when he returns since he is listed as non-smoker)     - No known allergies  ============  Plan  ===========  Status post permanent dual-chamber pacemaker implantation ([employer] MRI compatible) 6/29/2022.  Pacemaker site and function is normal although atrial stimulation threshold is slightly increased.    Patient has history of syncope.  EKG showed sinus bradycardia.  Patient apparently had a history 8-second pause with the first episode while he was in the ER.  No further episodes.    Activities limitations and expectations were again discussed with patient and patient's mother.    Follow-up in the office in 3 months with pacemaker interrogation.     Further plan will depend on patient's progress.  ]]]]]]]]]]]]]]]]]]                        Diagnosis Plan   1. Status post placement of cardiac pacemaker     2. Bradycardia, sinus     3. Syncope and collapse     4. Sick sinus syndrome (HCC)     LAB RESULTS (LAST 7 DAYS)    CBC        BMP        CMP         BNP        TROPONIN        CoAg        Creatinine Clearance  Estimated Creatinine Clearance: 158.2 mL/min (by C-G formula based on SCr of 0.98 mg/dL).    ABG        Radiology  No radiology results for the last day                The following portions of the patient's history were reviewed and updated as appropriate: allergies,  current medications, past family history, past medical history, past social history, past surgical history and problem list.    Review of Systems   Constitutional: Negative for fever and malaise/fatigue.   Cardiovascular: Negative for chest pain, dyspnea on exertion and palpitations.   Respiratory: Negative for cough and shortness of breath.    Skin: Negative for rash.   Gastrointestinal: Negative for abdominal pain, nausea and vomiting.   Neurological: Negative for focal weakness and headaches.   All other systems reviewed and are negative.      No current outpatient medications on file.    No Known Allergies    Family History   Problem Relation Age of Onset   • Heart disease Father    • Hypertension Maternal Grandmother    • Heart failure Maternal Grandmother    • Heart disease Maternal Grandmother    • Diabetes type I Maternal Grandmother    • Stroke Maternal Grandmother    • Hypertension Maternal Grandfather    • Diabetes type II Maternal Grandfather    • Diabetes type II Paternal Grandmother    • Hypertension Paternal Grandmother    • Heart disease Paternal Grandmother    • Diabetes type II Paternal Grandfather    • Hypertension Paternal Grandfather        Past Surgical History:   Procedure Laterality Date   • CARDIAC ELECTROPHYSIOLOGY PROCEDURE N/A 6/29/2022    Procedure: Pacemaker DC new [facility];  Surgeon: Micki Mane MD;  Location: [facility] INVASIVE LOCATION;  Service: Cardiovascular;  Laterality: N/A;   • EAR TUBES     • HERNIA REPAIR     • TEAR DUCT SURGERY         Past Medical History:   Diagnosis Date   • Bradycardia    • Syncope        Family History   Problem Relation Age of Onset   • Heart disease Father    • Hypertension Maternal Grandmother    • Heart failure Maternal Grandmother    • Heart disease Maternal Grandmother    • Diabetes type I Maternal Grandmother    • Stroke Maternal Grandmother    • Hypertension Maternal Grandfather    • Diabetes type II Maternal Grandfather    •  "Diabetes type II Paternal Grandmother    • Hypertension Paternal Grandmother    • Heart disease Paternal Grandmother    • Diabetes type II Paternal Grandfather    • Hypertension Paternal Grandfather        Social History     Socioeconomic History   • Marital status: Single   Tobacco Use   • Smoking status: Never Smoker   • Smokeless tobacco: Never Used   Vaping Use   • Vaping Use: Former   • Substances: Nicotine, Flavoring   • Devices: Refillable tank   Substance and Sexual Activity   • Alcohol use: Not Currently   • Drug use: Yes     Frequency: 7.0 times per week     Types: Marijuana     Comment: once a day   • Sexual activity: Defer         Procedures      Objective:       Physical Exam    /68   Pulse 69   Ht 177.8 cm (70\")   Wt 127 kg (279 lb)   SpO2 98%   BMI 40.03 kg/m²   The patient is alert, oriented and in no distress.    Vital signs as noted above.  Exogenous obesity (BMI 40)    Head and neck revealed no carotid bruits or jugular venous distension.  No thyromegaly or lymphadenopathy is present.    Lungs clear.  No wheezing.  Breath sounds are normal bilaterally.    Heart normal first and second heart sounds.  No murmur..  No pericardial rub is present.  No gallop is present.    Abdomen soft and nontender.  No organomegaly is present.    Extremities revealed good peripheral pulses without any pedal edema.    Skin warm and dry.  Pacemaker site looks normal.    Musculoskeletal system is grossly normal.    CNS grossly normal.    Reviewed and updated.        "

## 2022-07-30 LAB — QT INTERVAL: 405 MS

## 2022-10-18 ENCOUNTER — CLINICAL SUPPORT NO REQUIREMENTS (OUTPATIENT)
Dept: CARDIOLOGY | Facility: CLINIC | Age: 22
End: 2022-10-18

## 2022-10-18 ENCOUNTER — OFFICE VISIT (OUTPATIENT)
Dept: CARDIOLOGY | Facility: CLINIC | Age: 22
End: 2022-10-18

## 2022-10-18 VITALS
DIASTOLIC BLOOD PRESSURE: 67 MMHG | HEART RATE: 60 BPM | SYSTOLIC BLOOD PRESSURE: 104 MMHG | OXYGEN SATURATION: 98 % | HEIGHT: 70 IN | BODY MASS INDEX: 41.16 KG/M2 | WEIGHT: 287.5 LBS

## 2022-10-18 DIAGNOSIS — I49.5 SICK SINUS SYNDROME: ICD-10-CM

## 2022-10-18 DIAGNOSIS — Z95.0 PACEMAKER: ICD-10-CM

## 2022-10-18 DIAGNOSIS — Z95.0 STATUS POST PLACEMENT OF CARDIAC PACEMAKER: Primary | ICD-10-CM

## 2022-10-18 DIAGNOSIS — R55 SYNCOPE AND COLLAPSE: ICD-10-CM

## 2022-10-18 DIAGNOSIS — R00.1 BRADYCARDIA, SINUS: Primary | ICD-10-CM

## 2022-10-18 DIAGNOSIS — R00.1 BRADYCARDIA, SINUS: ICD-10-CM

## 2022-10-18 PROCEDURE — 99214 OFFICE O/P EST MOD 30 MIN: CPT | Performed by: INTERNAL MEDICINE

## 2022-10-18 PROCEDURE — 93280 PM DEVICE PROGR EVAL DUAL: CPT | Performed by: INTERNAL MEDICINE

## 2022-10-18 NOTE — PROGRESS NOTES
Encounter Date:10/18/2022  Last seen 7/12/2022      Patient ID: Kurtis Poon is a 22 y.o. male.    Chief Complaint:  Syncope  Sick sinus syndrome  Sinus bradycardia  Status post pacemaker        History of Present Illness  Since I have last seen, the patient has been without any chest discomfort ,shortness of breath, palpitations, dizziness or syncope.  Denies having any headache ,abdominal pain ,nausea, vomiting , diarrhea constipation, loss of weight or loss of appetite.  Denies having any excessive bruising ,hematuria or blood in the stool.    Review of all systems negative except as indicated.    Reviewed ROS.    Assessment and Plan      ]]]]]]]]]]]]]]]]]]]]]  Impression  ===========  -Status post permanent dual-chamber pacemaker implantation ([employer] MRI compatible) 6/29/2022  Both leads are active-fixation leads.     - History of syncope.  Apparently had 8-second pause while he was in the ER first time.  Patient apparently had seizure activity with first episode.  Possible cardiac seizure.  First episode was approximately a year ago and second episode for 4/3/2022 with following description.     Patient apparently approximately 1 year ago while he was standing in line at department store had syncopal episode starting with lightheadedness.  Patient's bystanders thought he may be having seizure activity with jerking motion while he was on the floor.  EMS were called and was taken to ER and was found to have bradycardia and apparently had 8-second period of asystole in the ER.  Patient apparently has been doing fine since then until recently on 4/3/2022 he had another episode while he was in the restaurant feeling fine and apparently had chipped molar and had terrible pain.  He had similar episode.  Patient was taken to the ER and no specific etiology was found.  Patient did not have any further episodes.     Echocardiogram-4/29/2022-normal     30-day event monitor 4/29/2022  Abnormal Holter monitor  with significant sinus bradycardia with heart rates as low as 34/min.  1 episode of second-degree AV block (single cycle) was noted.  Have contacted patient and he is not having any significant symptoms at this time.  Patient has an appointment on 6/2/2022.  Will discuss with him the options including consideration for EP consultation/pacemaker.  Patient to come to the ER if he has any symptoms.     - Status post hernia repair tubes in the ears     -Family history of coronary artery disease     - Smoker (will review with patient when he returns since he is listed as non-smoker)     - No known allergies  ============  Plan  ===========  Status post permanent dual-chamber pacemaker implantation ([employer] MRI compatible) 6/29/2022.  Pacemaker site and function is normal.  Interrogation of the pacemaker revealed excellent pacing parameters.  Battery status is 9 years.    Patient has history of syncope.  No further episodes.    Activities limitations and expectations were again discussed with patient and patient's mother.     Follow-up in the office in 6 months with pacemaker interrogation.     Further plan will depend on patient's progress.  ]]]]]]]]]]]]]]]]]]                            Diagnosis Plan   1. Status post placement of cardiac pacemaker        2. Sick sinus syndrome (HCC)        3. Bradycardia, sinus        4. Pacemaker        5. Syncope and collapse        LAB RESULTS (LAST 7 DAYS)    CBC        BMP        CMP         BNP        TROPONIN        CoAg        Creatinine Clearance  CrCl cannot be calculated (Patient's most recent lab result is older than the maximum 30 days allowed.).    ABG        Radiology  No radiology results for the last day                The following portions of the patient's history were reviewed and updated as appropriate: allergies, current medications, past family history, past medical history, past social history, past surgical history and problem list.    Review of  Systems   Constitutional: Negative for fever and malaise/fatigue.   Cardiovascular: Negative for chest pain, dyspnea on exertion, leg swelling and palpitations.   Respiratory: Negative for shortness of breath.    Skin: Negative for rash.   Gastrointestinal: Negative for nausea and vomiting.   Neurological: Negative for dizziness, focal weakness, headaches, light-headedness and numbness.   All other systems reviewed and are negative.      No current outpatient medications on file.    No Known Allergies    Family History   Problem Relation Age of Onset   • Heart disease Father    • Hypertension Maternal Grandmother    • Heart failure Maternal Grandmother    • Heart disease Maternal Grandmother    • Diabetes type I Maternal Grandmother    • Stroke Maternal Grandmother    • Hypertension Maternal Grandfather    • Diabetes type II Maternal Grandfather    • Diabetes type II Paternal Grandmother    • Hypertension Paternal Grandmother    • Heart disease Paternal Grandmother    • Diabetes type II Paternal Grandfather    • Hypertension Paternal Grandfather        Past Surgical History:   Procedure Laterality Date   • CARDIAC ELECTROPHYSIOLOGY PROCEDURE N/A 6/29/2022    Procedure: Pacemaker DC new [place] aware;  Surgeon: Micki Maen MD;  Location: [facility] INVASIVE LOCATION;  Service: Cardiovascular;  Laterality: N/A;   • EAR TUBES     • HERNIA REPAIR     • TEAR DUCT SURGERY         Past Medical History:   Diagnosis Date   • Bradycardia    • Syncope        Family History   Problem Relation Age of Onset   • Heart disease Father    • Hypertension Maternal Grandmother    • Heart failure Maternal Grandmother    • Heart disease Maternal Grandmother    • Diabetes type I Maternal Grandmother    • Stroke Maternal Grandmother    • Hypertension Maternal Grandfather    • Diabetes type II Maternal Grandfather    • Diabetes type II Paternal Grandmother    • Hypertension Paternal Grandmother    • Heart disease Paternal Grandmother   "  • Diabetes type II Paternal Grandfather    • Hypertension Paternal Grandfather        Social History     Socioeconomic History   • Marital status: Single   Tobacco Use   • Smoking status: Never   • Smokeless tobacco: Never   Vaping Use   • Vaping Use: Former   • Substances: Nicotine, Flavoring   • Devices: Refillable tank   Substance and Sexual Activity   • Alcohol use: Not Currently   • Drug use: Yes     Frequency: 7.0 times per week     Types: Marijuana     Comment: once a day   • Sexual activity: Defer         Procedures      Objective:       Physical Exam    /67 (BP Location: Right arm, Patient Position: Sitting, Cuff Size: Large Adult)   Pulse 60   Ht 177.8 cm (70\")   Wt 130 kg (287 lb 8 oz)   SpO2 98%   BMI 41.25 kg/m²   The patient is alert, oriented and in no distress.    Vital signs as noted above.    Head and neck revealed no carotid bruits or jugular venous distension.  No thyromegaly or lymphadenopathy is present.    Lungs clear.  No wheezing.  Breath sounds are normal bilaterally.    Heart normal first and second heart sounds.  No murmur..  No pericardial rub is present.  No gallop is present.    Abdomen soft and nontender.  No organomegaly is present.    Extremities revealed good peripheral pulses without any pedal edema.    Skin warm and dry.  Pacemaker site looks normal.    Musculoskeletal system is grossly normal.    CNS grossly normal.    Reviewed and updated.        "

## 2022-11-28 PROBLEM — R00.1 BRADYCARDIA: Status: ACTIVE | Noted: 2022-11-28

## 2023-01-17 PROCEDURE — 93296 REM INTERROG EVL PM/IDS: CPT | Performed by: INTERNAL MEDICINE

## 2023-01-17 PROCEDURE — 93294 REM INTERROG EVL PM/LDLS PM: CPT | Performed by: INTERNAL MEDICINE

## 2023-04-18 PROCEDURE — 93294 REM INTERROG EVL PM/LDLS PM: CPT | Performed by: INTERNAL MEDICINE

## 2023-04-18 PROCEDURE — 93296 REM INTERROG EVL PM/IDS: CPT | Performed by: INTERNAL MEDICINE

## 2023-04-30 NOTE — PROGRESS NOTES
Encounter Date:05/02/2023    Last seen 10/18/2022      Patient ID: Kurtis Poon is a 22 y.o. male.    Chief Complaint:    Syncope  Sick sinus syndrome  Sinus bradycardia  Status post pacemaker        History of Present Illness    Since I have last seen, the patient has been without any chest discomfort ,shortness of breath, palpitations, dizziness or syncope.  Denies having any headache ,abdominal pain ,nausea, vomiting , diarrhea constipation, loss of weight or loss of appetite.  Denies having any excessive bruising ,hematuria or blood in the stool.    Review of all systems negative except as indicated.    Reviewed ROS.  Assessment and Plan      ]]]]]]]]]]]]]]]]]]]]]  Impression  ===========  -Status post permanent dual-chamber pacemaker implantation ([employer] MRI compatible) 6/29/2022  Both leads are active-fixation leads.     - History of syncope-improved   Apparently had 8-second pause while he was in the ER first time.  Patient apparently had seizure activity with first episode.  Possible cardiac seizure.  First episode was approximately a year ago and second episode for 4/3/2022 with following description.     Patient apparently approximately 1 year ago while he was standing in line at department store had syncopal episode starting with lightheadedness.  Patient's bystanders thought he may be having seizure activity with jerking motion while he was on the floor.  EMS were called and was taken to ER and was found to have bradycardia and apparently had 8-second period of asystole in the ER.  Patient apparently has been doing fine since then until recently on 4/3/2022 he had another episode while he was in the restaurant feeling fine and apparently had chipped molar and had terrible pain.  He had similar episode.  Patient was taken to the ER and no specific etiology was found.  Patient did not have any further episodes.     Echocardiogram-4/29/2022-normal     30-day event monitor 4/29/2022  Abnormal  Holter monitor with significant sinus bradycardia with heart rates as low as 34/min.  1 episode of second-degree AV block (single cycle) was noted.  Have contacted patient and he is not having any significant symptoms at this time.  Patient has an appointment on 6/2/2022.  Will discuss with him the options including consideration for EP consultation/pacemaker.  Patient to come to the ER if he has any symptoms.     - Status post hernia repair tubes in the ears     -Family history of coronary artery disease     - Former smoker.  Smokes marijuana.  Vaping.     - No known allergies  ============  Plan  ===========  Status post permanent dual-chamber pacemaker implantation ([employer] MRI compatible) 6/29/2022.  Pacemaker site and function is normal.  Interrogation of the pacemaker revealed excellent pacing parameters.  Battery status is 8.5 years.     Patient has history of syncope.  No further episodes.     Activities limitations and expectations were again discussed with patient and patient's mother.     Follow-up in the office in 6 months with pacemaker interrogation.     Further plan will depend on patient's progress.  ]]]]]]]]]]]]]]]]]]                        Diagnosis Plan   1. Bradycardia, sinus  ECG 12 Lead      2. Status post placement of cardiac pacemaker  ECG 12 Lead      3. Sick sinus syndrome  ECG 12 Lead      4. Syncope and collapse  ECG 12 Lead      5. Pacemaker  ECG 12 Lead      LAB RESULTS (LAST 7 DAYS)    CBC        BMP        CMP         BNP        TROPONIN        CoAg        Creatinine Clearance  CrCl cannot be calculated (Patient's most recent lab result is older than the maximum 30 days allowed.).    ABG        Radiology  No radiology results for the last day                The following portions of the patient's history were reviewed and updated as appropriate: allergies, current medications, past family history, past medical history, past social history, past surgical history and problem  list.    Review of Systems   Constitutional: Negative for malaise/fatigue.   Cardiovascular: Negative for chest pain, dyspnea on exertion, leg swelling and palpitations.   Respiratory: Negative for cough and shortness of breath.    Gastrointestinal: Negative for abdominal pain, nausea and vomiting.   Neurological: Negative for dizziness, focal weakness, headaches, light-headedness and numbness.   All other systems reviewed and are negative.      No current outpatient medications on file.    No Known Allergies    Family History   Problem Relation Age of Onset   • Heart disease Father    • Hypertension Maternal Grandmother    • Heart failure Maternal Grandmother    • Heart disease Maternal Grandmother    • Diabetes type I Maternal Grandmother    • Stroke Maternal Grandmother    • Hypertension Maternal Grandfather    • Diabetes type II Maternal Grandfather    • Diabetes type II Paternal Grandmother    • Hypertension Paternal Grandmother    • Heart disease Paternal Grandmother    • Diabetes type II Paternal Grandfather    • Hypertension Paternal Grandfather        Past Surgical History:   Procedure Laterality Date   • CARDIAC ELECTROPHYSIOLOGY PROCEDURE N/A 6/29/2022    Procedure: Pacemaker DC new [facility];  Surgeon: Micki Mane MD;  Location: [facility] INVASIVE LOCATION;  Service: Cardiovascular;  Laterality: N/A;   • EAR TUBES     • HERNIA REPAIR     • TEAR DUCT SURGERY         Past Medical History:   Diagnosis Date   • Bradycardia    • Syncope        Family History   Problem Relation Age of Onset   • Heart disease Father    • Hypertension Maternal Grandmother    • Heart failure Maternal Grandmother    • Heart disease Maternal Grandmother    • Diabetes type I Maternal Grandmother    • Stroke Maternal Grandmother    • Hypertension Maternal Grandfather    • Diabetes type II Maternal Grandfather    • Diabetes type II Paternal Grandmother    • Hypertension Paternal Grandmother    • Heart disease Paternal  "Grandmother    • Diabetes type II Paternal Grandfather    • Hypertension Paternal Grandfather        Social History     Socioeconomic History   • Marital status: Single   Tobacco Use   • Smoking status: Never   • Smokeless tobacco: Never   Vaping Use   • Vaping Use: Former   • Substances: Nicotine, Flavoring   • Devices: Refillable tank   Substance and Sexual Activity   • Alcohol use: Not Currently   • Drug use: Yes     Frequency: 7.0 times per week     Types: Marijuana     Comment: once a day   • Sexual activity: Defer           ECG 12 Lead    Date/Time: 5/2/2023 4:11 PM  Performed by: Micki Mane MD  Authorized by: Micki Mane MD   Comparison: compared with previous ECG   Similar to previous ECG  Comparison to previous ECG: Sinus rhythm 68/min normal axis normal intervals no ectopy no significant change from 6/29/2022                Objective:       Physical Exam    /76   Pulse 73   Ht 177.8 cm (70\")   Wt (!) 138 kg (305 lb)   SpO2 98%   BMI 43.76 kg/m²   The patient is alert, oriented and in no distress.    Vital signs as noted above.    Head and neck revealed no carotid bruits or jugular venous distension.  No thyromegaly or lymphadenopathy is present.    Lungs clear.  No wheezing.  Breath sounds are normal bilaterally.    Heart normal first and second heart sounds.  No murmur..  No pericardial rub is present.  No gallop is present.    Abdomen soft and nontender.  No organomegaly is present.    Extremities revealed good peripheral pulses without any pedal edema.    Skin warm and dry.  Pacemaker site looks normal.    Musculoskeletal system is grossly normal.    CNS grossly normal.    Reviewed and updated.        "

## 2023-05-02 ENCOUNTER — OFFICE VISIT (OUTPATIENT)
Dept: CARDIOLOGY | Facility: CLINIC | Age: 23
End: 2023-05-02
Payer: COMMERCIAL

## 2023-05-02 ENCOUNTER — CLINICAL SUPPORT NO REQUIREMENTS (OUTPATIENT)
Dept: CARDIOLOGY | Facility: CLINIC | Age: 23
End: 2023-05-02
Payer: COMMERCIAL

## 2023-05-02 VITALS
OXYGEN SATURATION: 98 % | SYSTOLIC BLOOD PRESSURE: 129 MMHG | BODY MASS INDEX: 43.67 KG/M2 | DIASTOLIC BLOOD PRESSURE: 76 MMHG | HEART RATE: 73 BPM | HEIGHT: 70 IN | WEIGHT: 305 LBS

## 2023-05-02 DIAGNOSIS — R00.1 BRADYCARDIA, SINUS: Primary | ICD-10-CM

## 2023-05-02 DIAGNOSIS — Z95.0 PACEMAKER: Primary | ICD-10-CM

## 2023-05-02 DIAGNOSIS — Z95.0 PACEMAKER: ICD-10-CM

## 2023-05-02 DIAGNOSIS — I49.5 SICK SINUS SYNDROME: ICD-10-CM

## 2023-05-02 DIAGNOSIS — R00.1 BRADYCARDIA: ICD-10-CM

## 2023-05-02 DIAGNOSIS — Z95.0 STATUS POST PLACEMENT OF CARDIAC PACEMAKER: ICD-10-CM

## 2023-05-02 DIAGNOSIS — R55 SYNCOPE AND COLLAPSE: ICD-10-CM

## 2023-05-02 PROCEDURE — 93000 ELECTROCARDIOGRAM COMPLETE: CPT | Performed by: INTERNAL MEDICINE

## 2023-05-02 PROCEDURE — 99214 OFFICE O/P EST MOD 30 MIN: CPT | Performed by: INTERNAL MEDICINE

## 2023-11-07 ENCOUNTER — TELEPHONE (OUTPATIENT)
Dept: CARDIOLOGY | Facility: CLINIC | Age: 23
End: 2023-11-07
Payer: COMMERCIAL

## 2023-11-07 NOTE — TELEPHONE ENCOUNTER
Caller: JULIANNE LANDON    Relationship to patient: Mother    Best call back number: [phone]    Chief complaint: OUT OF TOWN    Type of visit: DEVICE CHECK    Requested date: AFTER 11.21.23     If rescheduling, when is the original appointment: 11.14.23     Additional notes: NO AVAILABILITY IN THE SAME DAY  WITH DEVICE CHECK FIRST AND WITHIN 30 MINUTES

## 2023-11-17 NOTE — PROGRESS NOTES
Encounter Date:12/11/2023    Last seen 5/2/2023      Patient ID: Kurtis Poon is a 23 y.o. male.    Chief Complaint:     Syncope  Sick sinus syndrome  Sinus bradycardia  Status post pacemaker     History of Present Illness     Since I have last seen, the patient has been without any chest discomfort ,shortness of breath, palpitations, dizziness or syncope.  Denies having any headache ,abdominal pain ,nausea, vomiting , diarrhea constipation, loss of weight or loss of appetite.  Denies having any excessive bruising ,hematuria or blood in the stool.     Review of all systems negative except as indicated.     Reviewed ROS.  Assessment and Plan      ]]]]]]]]]]]]]]]]]]]]]  History  ===========  -Status post permanent dual-chamber pacemaker implantation ([employer] MRI compatible) 6/29/2022  Both leads are active-fixation leads.     - History of syncope-improved   Apparently had 8-second pause while he was in the ER first time.  Patient apparently had seizure activity with first episode.  Possible cardiac seizure.  First episode was approximately a year ago and second episode for 4/3/2022 with following description.     Patient apparently approximately 1 year ago while he was standing in line at [employer] store had syncopal episode starting with lightheadedness.  Patient's bystanders thought he may be having seizure activity with jerking motion while he was on the floor.  EMS were called and was taken to ER and was found to have bradycardia and apparently had 8-second period of asystole in the ER.  Patient apparently has been doing fine since then until recently on 4/3/2022 he had another episode while he was in the restaurant feeling fine and apparently had chipped molar and had terrible pain.  He had similar episode.  Patient was taken to the ER and no specific etiology was found.  Patient did not have any further episodes.     Echocardiogram-4/29/2022-normal     30-day event monitor 4/29/2022  Abnormal Holter  monitor with significant sinus bradycardia with heart rates as low as 34/min.  1 episode of second-degree AV block (single cycle) was noted.  Have contacted patient and he is not having any significant symptoms at this time.  Patient has an appointment on 6/2/2022.  Will discuss with him the options including consideration for EP consultation/pacemaker.  Patient to come to the ER if he has any symptoms.     - Status post hernia repair tubes in the ears     -Family history of coronary artery disease     - Former smoker.  Smokes marijuana.  Vaping.     - No known allergies  ============  Plan  ===========  Status post permanent dual-chamber pacemaker implantation ([employer] MRI compatible) 6/29/2022.  Pacemaker site and function is normal.  Interrogation of the pacemaker revealed excellent pacing parameters.-12/11/2023  Battery status is 7.5 years.  Multiple questions were asked and answered to his and his mother's satisfaction patient has history of syncope.  No further episodes.     Activities limitations and expectations were again discussed with patient and patient's mother.     Follow-up in the office in 6 months with pacemaker interrogation.     Further plan will depend on patient's progress.    Reviewed and updated-12/11/2023.    ]]]]]]]]]]]]]]]]]]                       Diagnosis Plan   1. Pacemaker        2. Bradycardia, sinus        3. Bradycardia        4. Status post placement of cardiac pacemaker        5. Syncope and collapse        6. Sick sinus syndrome        LAB RESULTS (LAST 7 DAYS)    CBC        BMP        CMP         BNP        TROPONIN        CoAg        Creatinine Clearance  CrCl cannot be calculated (Patient's most recent lab result is older than the maximum 30 days allowed.).    ABG        Radiology  No radiology results for the last day                The following portions of the patient's history were reviewed and updated as appropriate: allergies, current medications, past family  history, past medical history, past social history, past surgical history, and problem list.    Review of Systems   Constitutional: Negative for malaise/fatigue.   Cardiovascular:  Negative for chest pain, leg swelling, palpitations and syncope.   Respiratory:  Negative for shortness of breath.    Skin:  Negative for rash.   Gastrointestinal:  Negative for nausea and vomiting.   Neurological:  Negative for dizziness, light-headedness and numbness.   All other systems reviewed and are negative.      No current outpatient medications on file.    No Known Allergies    Family History   Problem Relation Age of Onset    Heart disease Father     Hypertension Maternal Grandmother     Heart failure Maternal Grandmother     Heart disease Maternal Grandmother     Diabetes type I Maternal Grandmother     Stroke Maternal Grandmother     Hypertension Maternal Grandfather     Diabetes type II Maternal Grandfather     Diabetes type II Paternal Grandmother     Hypertension Paternal Grandmother     Heart disease Paternal Grandmother     Diabetes type II Paternal Grandfather     Hypertension Paternal Grandfather        Past Surgical History:   Procedure Laterality Date    CARDIAC ELECTROPHYSIOLOGY PROCEDURE N/A 6/29/2022    Procedure: Pacemaker DC new [place] aware;  Surgeon: Micki Mane MD;  Location: [facility] INVASIVE LOCATION;  Service: Cardiovascular;  Laterality: N/A;    EAR TUBES      HERNIA REPAIR      TEAR DUCT SURGERY         Past Medical History:   Diagnosis Date    Bradycardia     Syncope        Family History   Problem Relation Age of Onset    Heart disease Father     Hypertension Maternal Grandmother     Heart failure Maternal Grandmother     Heart disease Maternal Grandmother     Diabetes type I Maternal Grandmother     Stroke Maternal Grandmother     Hypertension Maternal Grandfather     Diabetes type II Maternal Grandfather     Diabetes type II Paternal Grandmother     Hypertension Paternal Grandmother      "Heart disease Paternal Grandmother     Diabetes type II Paternal Grandfather     Hypertension Paternal Grandfather        Social History     Socioeconomic History    Marital status: Single   Tobacco Use    Smoking status: Never    Smokeless tobacco: Never   Vaping Use    Vaping Use: Former    Substances: Nicotine, Flavoring    Devices: Refillable tank   Substance and Sexual Activity    Alcohol use: Not Currently    Drug use: Yes     Frequency: 7.0 times per week     Types: Marijuana     Comment: once a day    Sexual activity: Defer         Procedures      Objective:       Physical Exam    /69 (BP Location: Right arm, Patient Position: Sitting, Cuff Size: Large Adult)   Pulse 73   Ht 177.8 cm (70\")   Wt (!) 145 kg (320 lb)   SpO2 97% Comment: RA  BMI 45.92 kg/m²   The patient is alert, oriented and in no distress.    Vital signs as noted above.  Exogenous obesity (BMI 46)    Head and neck revealed no carotid bruits or jugular venous distension.  No thyromegaly or lymphadenopathy is present.    Lungs clear.  No wheezing.  Breath sounds are normal bilaterally.    Heart normal first and second heart sounds.  No murmur..  No pericardial rub is present.  No gallop is present.    Abdomen soft and nontender.  No organomegaly is present.    Extremities revealed good peripheral pulses without any pedal edema.    Skin warm and dry.  Pacemaker site looks normal.    Musculoskeletal system is grossly normal.    CNS grossly normal.    Reviewed and updated.        "

## 2023-12-11 ENCOUNTER — OFFICE VISIT (OUTPATIENT)
Dept: CARDIOLOGY | Facility: CLINIC | Age: 23
End: 2023-12-11
Payer: COMMERCIAL

## 2023-12-11 ENCOUNTER — CLINICAL SUPPORT NO REQUIREMENTS (OUTPATIENT)
Dept: CARDIOLOGY | Facility: CLINIC | Age: 23
End: 2023-12-11
Payer: COMMERCIAL

## 2023-12-11 VITALS
SYSTOLIC BLOOD PRESSURE: 105 MMHG | BODY MASS INDEX: 45.1 KG/M2 | OXYGEN SATURATION: 97 % | HEART RATE: 73 BPM | WEIGHT: 315 LBS | DIASTOLIC BLOOD PRESSURE: 69 MMHG | HEIGHT: 70 IN

## 2023-12-11 DIAGNOSIS — Z95.0 PACEMAKER: Primary | ICD-10-CM

## 2023-12-11 DIAGNOSIS — Z95.0 STATUS POST PLACEMENT OF CARDIAC PACEMAKER: ICD-10-CM

## 2023-12-11 DIAGNOSIS — R00.1 BRADYCARDIA: ICD-10-CM

## 2023-12-11 DIAGNOSIS — R00.1 BRADYCARDIA: Primary | ICD-10-CM

## 2023-12-11 DIAGNOSIS — R00.1 BRADYCARDIA, SINUS: ICD-10-CM

## 2023-12-11 DIAGNOSIS — I49.5 SICK SINUS SYNDROME: ICD-10-CM

## 2023-12-11 DIAGNOSIS — Z95.0 PACEMAKER: ICD-10-CM

## 2023-12-11 DIAGNOSIS — R55 SYNCOPE AND COLLAPSE: ICD-10-CM

## 2023-12-11 PROCEDURE — 99214 OFFICE O/P EST MOD 30 MIN: CPT | Performed by: INTERNAL MEDICINE

## 2023-12-11 PROCEDURE — 93280 PM DEVICE PROGR EVAL DUAL: CPT | Performed by: INTERNAL MEDICINE

## 2024-06-21 NOTE — PROGRESS NOTES
Encounter Date:06/25/2024  Last seen 12/11/2023      Patient ID: Kurtis Poon is a 23 y.o. male.    Chief Complaint:     Syncope  Sick sinus syndrome  Sinus bradycardia  Status post pacemaker     History of Present Illness     Since I have last seen, the patient has been without any chest discomfort ,shortness of breath, palpitations, dizziness or syncope.  Denies having any headache ,abdominal pain ,nausea, vomiting , diarrhea constipation, loss of weight or loss of appetite.  Denies having any excessive bruising ,hematuria or blood in the stool.    Review of all systems negative except as indicated.    Reviewed ROS.  Assessment and Plan      ]]]]]]]]]]]]]]]]]]]]]  History  ===========  -Status post permanent dual-chamber pacemaker implantation ([employer] MRI compatible) 6/29/2022  Both leads are active-fixation leads.     - History of syncope-improved-no further episodes   Apparently had 8-second pause while he was in the ER first time.  Patient apparently had seizure activity with first episode.  Possible cardiac seizure.  First episode was approximately a year ago and second episode for 4/3/2022 with following description.     Patient apparently approximately 1 year ago while he was standing in line at department store had syncopal episode starting with lightheadedness.  Patient's bystanders thought he may be having seizure activity with jerking motion while he was on the floor.  EMS were called and was taken to ER and was found to have bradycardia and apparently had 8-second period of asystole in the ER.  Patient apparently has been doing fine since then until recently on 4/3/2022 he had another episode while he was in the restaurant feeling fine and apparently had chipped molar and had terrible pain.  He had similar episode.  Patient was taken to the ER and no specific etiology was found.  Patient did not have any further episodes.     Echocardiogram-4/29/2022-normal     30-day event monitor  4/29/2022  Abnormal Holter monitor with significant sinus bradycardia with heart rates as low as 34/min.  1 episode of second-degree AV block (single cycle) was noted.  Have contacted patient and he is not having any significant symptoms at this time.  Patient has an appointment on 6/2/2022.  Will discuss with him the options including consideration for EP consultation/pacemaker.  Patient to come to the ER if he has any symptoms.     - Status post hernia repair tubes in the ears     -Family history of coronary artery disease     - Former smoker.  Smokes marijuana.  Vaping.     - No known allergies  ============  Plan  ===========  Status post permanent dual-chamber pacemaker implantation ([employer] MRI compatible) 6/29/2022.  Pacemaker site and function is normal.  Interrogation of the pacemaker revealed excellent pacing parameters.-6/25/2024  Battery status is 7 years.    Patient has history of syncope.  No further episodes syncope or seizures..     Activities limitations and expectations were again discussed with patient and patient's mother.     Follow-up in the office in 6 months with pacemaker interrogation.     Further plan will depend on patient's progress.     Reviewed and updated-6/25/2024.  ]]]]]]]]]]]]]]]]]]                Diagnosis Plan   1. Pacemaker        2. Status post placement of cardiac pacemaker        3. Bradycardia, sinus        4. Syncope and collapse        5. Bradycardia        6. Sick sinus syndrome        LAB RESULTS (LAST 7 DAYS)    CBC        BMP        CMP         BNP        TROPONIN        CoAg        Creatinine Clearance  CrCl cannot be calculated (Patient's most recent lab result is older than the maximum 30 days allowed.).    ABG        Radiology  No radiology results for the last day                The following portions of the patient's history were reviewed and updated as appropriate: allergies, current medications, past family history, past medical history, past social  history, past surgical history, and problem list.    Review of Systems   Constitutional: Negative for malaise/fatigue.   Cardiovascular:  Negative for chest pain, dyspnea on exertion, leg swelling and palpitations.   Respiratory:  Negative for cough and shortness of breath.    Gastrointestinal:  Negative for abdominal pain, nausea and vomiting.   Neurological:  Negative for dizziness, focal weakness, headaches, light-headedness and numbness.   All other systems reviewed and are negative.    No current outpatient medications on file.    No Known Allergies    Family History   Problem Relation Age of Onset    Heart disease Father     Hypertension Maternal Grandmother     Heart failure Maternal Grandmother     Heart disease Maternal Grandmother     Diabetes type I Maternal Grandmother     Stroke Maternal Grandmother     Hypertension Maternal Grandfather     Diabetes type II Maternal Grandfather     Diabetes type II Paternal Grandmother     Hypertension Paternal Grandmother     Heart disease Paternal Grandmother     Diabetes type II Paternal Grandfather     Hypertension Paternal Grandfather        Past Surgical History:   Procedure Laterality Date    CARDIAC ELECTROPHYSIOLOGY PROCEDURE N/A 6/29/2022    Procedure: Pacemaker DC new [place] aware;  Surgeon: Micki Mane MD;  Location: [facility] INVASIVE LOCATION;  Service: Cardiovascular;  Laterality: N/A;    EAR TUBES      HERNIA REPAIR      TEAR DUCT SURGERY         Past Medical History:   Diagnosis Date    Bradycardia     Syncope        Family History   Problem Relation Age of Onset    Heart disease Father     Hypertension Maternal Grandmother     Heart failure Maternal Grandmother     Heart disease Maternal Grandmother     Diabetes type I Maternal Grandmother     Stroke Maternal Grandmother     Hypertension Maternal Grandfather     Diabetes type II Maternal Grandfather     Diabetes type II Paternal Grandmother     Hypertension Paternal Grandmother     Heart  disease Paternal Grandmother     Diabetes type II Paternal Grandfather     Hypertension Paternal Grandfather        Social History     Socioeconomic History    Marital status: Single   Tobacco Use    Smoking status: Never    Smokeless tobacco: Never   Vaping Use    Vaping status: Former    Substances: Nicotine, Flavoring    Devices: Refillable tank   Substance and Sexual Activity    Alcohol use: Not Currently    Drug use: Yes     Frequency: 7.0 times per week     Types: Marijuana     Comment: once a day    Sexual activity: Defer           ECG 12 Lead    Date/Time: 6/25/2024 2:42 PM  Performed by: Micki Mane MD    Authorized by: Micki Mane MD  Comparison: compared with previous ECG   Similar to previous ECG  Comparison to previous ECG: Atrial paced ventricular sensed rhythm 63/min right axis deviation no ectopy no significant change from previous EKG.        Objective:       Physical Exam    There were no vitals taken for this visit.  The patient is alert, oriented and in no distress.    Vital signs as noted above.  Exogenous obesity (BMI 44)    Head and neck revealed no carotid bruits or jugular venous distension.  No thyromegaly or lymphadenopathy is present.    Lungs clear.  No wheezing.  Breath sounds are normal bilaterally.    Heart normal first and second heart sounds.  No murmur..  No pericardial rub is present.  No gallop is present.    Abdomen soft and nontender.  No organomegaly is present.    Extremities revealed good peripheral pulses without any pedal edema.    Skin warm and dry.  Pacemaker site looks normal.    Musculoskeletal system is grossly normal.    CNS grossly normal.    Reviewed and updated.

## 2024-06-25 ENCOUNTER — CLINICAL SUPPORT NO REQUIREMENTS (OUTPATIENT)
Dept: CARDIOLOGY | Facility: CLINIC | Age: 24
End: 2024-06-25
Payer: COMMERCIAL

## 2024-06-25 ENCOUNTER — OFFICE VISIT (OUTPATIENT)
Dept: CARDIOLOGY | Facility: CLINIC | Age: 24
End: 2024-06-25
Payer: COMMERCIAL

## 2024-06-25 VITALS
WEIGHT: 303 LBS | DIASTOLIC BLOOD PRESSURE: 69 MMHG | HEIGHT: 70 IN | SYSTOLIC BLOOD PRESSURE: 101 MMHG | OXYGEN SATURATION: 97 % | HEART RATE: 77 BPM | BODY MASS INDEX: 43.38 KG/M2

## 2024-06-25 DIAGNOSIS — R00.1 BRADYCARDIA, SINUS: ICD-10-CM

## 2024-06-25 DIAGNOSIS — Z95.0 PACEMAKER: Primary | ICD-10-CM

## 2024-06-25 DIAGNOSIS — I49.5 SICK SINUS SYNDROME: ICD-10-CM

## 2024-06-25 DIAGNOSIS — Z95.0 PACEMAKER: ICD-10-CM

## 2024-06-25 DIAGNOSIS — Z95.0 STATUS POST PLACEMENT OF CARDIAC PACEMAKER: ICD-10-CM

## 2024-06-25 DIAGNOSIS — R00.1 BRADYCARDIA: ICD-10-CM

## 2024-06-25 DIAGNOSIS — R00.1 BRADYCARDIA: Primary | ICD-10-CM

## 2024-06-25 DIAGNOSIS — R55 SYNCOPE AND COLLAPSE: ICD-10-CM

## 2024-06-25 PROCEDURE — 93000 ELECTROCARDIOGRAM COMPLETE: CPT | Performed by: INTERNAL MEDICINE

## 2024-06-25 PROCEDURE — 93280 PM DEVICE PROGR EVAL DUAL: CPT | Performed by: INTERNAL MEDICINE

## 2024-06-25 PROCEDURE — 99214 OFFICE O/P EST MOD 30 MIN: CPT | Performed by: INTERNAL MEDICINE

## 2024-12-02 PROCEDURE — 93294 REM INTERROG EVL PM/LDLS PM: CPT | Performed by: INTERNAL MEDICINE

## 2024-12-02 PROCEDURE — 93296 REM INTERROG EVL PM/IDS: CPT | Performed by: INTERNAL MEDICINE

## 2025-01-07 NOTE — PROGRESS NOTES
Encounter Date:01/09/2025    Last seen 6/25/2024.      Patient ID: Kurtis Poon is a 24 y.o. male.    Chief Complaint:     Syncope  Sick sinus syndrome  Sinus bradycardia  Status post pacemaker     History of Present Illness     Since I have last seen, the patient has been without any chest discomfort ,shortness of breath, palpitations, dizziness or syncope.  Denies having any headache ,abdominal pain ,nausea, vomiting , diarrhea constipation, loss of weight or loss of appetite.  Denies having any excessive bruising ,hematuria or blood in the stool.    Review of all systems negative except as indicated.    Reviewed ROS..  Assessment and Plan      ]]]]]]]]]]]]]]]]]]]]]  History  ===========  -Status post permanent dual-chamber pacemaker implantation (Impact MRI compatible) 6/29/2022  Both leads are active-fixation leads.     - History of syncope-improved-no further episodes   Apparently had 8-second pause while he was in the ER first time.  Patient apparently had seizure activity with first episode.  Possible cardiac seizure.  First episode was approximately a year ago and second episode for 4/3/2022 with following description.     Patient apparently approximately 1 year ago while he was standing in line at department store had syncopal episode starting with lightheadedness.  Patient's bystanders thought he may be having seizure activity with jerking motion while he was on the floor.  EMS were called and was taken to ER and was found to have bradycardia and apparently had 8-second period of asystole in the ER.  Patient apparently has been doing fine since then until recently on 4/3/2022 he had another episode while he was in the restaurant feeling fine and apparently had chipped molar and had terrible pain.  He had similar episode.  Patient was taken to the ER and no specific etiology was found.  Patient did not have any further episodes.     Echocardiogram-4/29/2022-normal     30-day event monitor  4/29/2022  Abnormal Holter monitor with significant sinus bradycardia with heart rates as low as 34/min.  1 episode of second-degree AV block (single cycle) was noted.  Have contacted patient and he is not having any significant symptoms at this time.  Patient has an appointment on 6/2/2022.  Will discuss with him the options including consideration for EP consultation/pacemaker.  Patient to come to the ER if he has any symptoms.     - Status post hernia repair tubes in the ears     -Family history of coronary artery disease     - Former smoker.  Smokes marijuana.  Vaping.     - No known allergies  ============  Plan  ===========  Status post permanent dual-chamber pacemaker implantation ([employer] MRI compatible) 6/29/2022.  Pacemaker site and function is normal.  Interrogation of the pacemaker revealed excellent pacing parameters-1/9/2025.  Battery status is 7 years.    Past history of syncope due to pacemaker implantation.  No further episodes of syncope or seizures since pacemaker implantation.    Activities limitations and expectations were again discussed with patient and patient's mother.     Follow-up in the office in 6 months with pacemaker interrogation.     Further plan will depend on patient's progress.     Reviewed and updated 1/9/2025  ]]]]]]]]]]]]]]]]]]                   Diagnosis Plan   1. Pacemaker        2. Syncope and collapse        3. Status post placement of cardiac pacemaker        4. Bradycardia, sinus        5. Bradycardia        6. Sick sinus syndrome        LAB RESULTS (LAST 7 DAYS)    CBC        BMP        CMP         BNP        TROPONIN        CoAg        Creatinine Clearance  CrCl cannot be calculated (Patient's most recent lab result is older than the maximum 30 days allowed.).    ABG        Radiology  No radiology results for the last day                The following portions of the patient's history were reviewed and updated as appropriate: allergies, current medications,  past family history, past medical history, past social history, past surgical history, and problem list.    Review of Systems   Constitutional: Negative for malaise/fatigue.   Cardiovascular:  Negative for chest pain, dyspnea on exertion, leg swelling and palpitations.   Respiratory:  Negative for cough and shortness of breath.    Gastrointestinal:  Negative for abdominal pain, nausea and vomiting.   Neurological:  Negative for dizziness, focal weakness, headaches, light-headedness and numbness.   All other systems reviewed and are negative.    No current outpatient medications on file.    No Known Allergies    Family History   Problem Relation Age of Onset    Heart disease Father     Hypertension Maternal Grandmother     Heart failure Maternal Grandmother     Heart disease Maternal Grandmother     Diabetes type I Maternal Grandmother     Stroke Maternal Grandmother     Hypertension Maternal Grandfather     Diabetes type II Maternal Grandfather     Diabetes type II Paternal Grandmother     Hypertension Paternal Grandmother     Heart disease Paternal Grandmother     Diabetes type II Paternal Grandfather     Hypertension Paternal Grandfather        Past Surgical History:   Procedure Laterality Date    CARDIAC ELECTROPHYSIOLOGY PROCEDURE N/A 6/29/2022    Procedure: Pacemaker DC new [place] aware;  Surgeon: Micki Mane MD;  Location: [facility] INVASIVE LOCATION;  Service: Cardiovascular;  Laterality: N/A;    EAR TUBES      HERNIA REPAIR      TEAR DUCT SURGERY         Past Medical History:   Diagnosis Date    Bradycardia     Syncope        Family History   Problem Relation Age of Onset    Heart disease Father     Hypertension Maternal Grandmother     Heart failure Maternal Grandmother     Heart disease Maternal Grandmother     Diabetes type I Maternal Grandmother     Stroke Maternal Grandmother     Hypertension Maternal Grandfather     Diabetes type II Maternal Grandfather     Diabetes type II Paternal Grandmother      Hypertension Paternal Grandmother     Heart disease Paternal Grandmother     Diabetes type II Paternal Grandfather     Hypertension Paternal Grandfather        Social History     Socioeconomic History    Marital status: Single   Tobacco Use    Smoking status: Never     Passive exposure: Never    Smokeless tobacco: Never   Vaping Use    Vaping status: Former    Substances: Nicotine, Flavoring    Devices: Refillable tank   Substance and Sexual Activity    Alcohol use: Not Currently    Drug use: Yes     Frequency: 7.0 times per week     Types: Marijuana     Comment: once a day    Sexual activity: Defer         Procedures      Objective:       Physical Exam    There were no vitals taken for this visit.  The patient is alert, oriented and in no distress.    Vital signs as noted above.    Head and neck revealed no carotid bruits or jugular venous distension.  No thyromegaly or lymphadenopathy is present.    Lungs clear.  No wheezing.  Breath sounds are normal bilaterally.    Heart normal first and second heart sounds.  No murmur..  No pericardial rub is present.  No gallop is present.    Abdomen soft and nontender.  No organomegaly is present.    Extremities revealed good peripheral pulses without any pedal edema.    Skin warm and dry.  Pacemaker site looks normal.    Musculoskeletal system is grossly normal.    CNS grossly normal.    Reviewed and updated.

## 2025-01-09 ENCOUNTER — CLINICAL SUPPORT NO REQUIREMENTS (OUTPATIENT)
Dept: CARDIOLOGY | Facility: CLINIC | Age: 25
End: 2025-01-09
Payer: COMMERCIAL

## 2025-01-09 ENCOUNTER — OFFICE VISIT (OUTPATIENT)
Dept: CARDIOLOGY | Facility: CLINIC | Age: 25
End: 2025-01-09
Payer: COMMERCIAL

## 2025-01-09 VITALS
HEIGHT: 70 IN | WEIGHT: 288 LBS | DIASTOLIC BLOOD PRESSURE: 68 MMHG | BODY MASS INDEX: 41.23 KG/M2 | OXYGEN SATURATION: 96 % | SYSTOLIC BLOOD PRESSURE: 131 MMHG | HEART RATE: 75 BPM

## 2025-01-09 DIAGNOSIS — R00.1 BRADYCARDIA, SINUS: ICD-10-CM

## 2025-01-09 DIAGNOSIS — Z95.0 STATUS POST PLACEMENT OF CARDIAC PACEMAKER: ICD-10-CM

## 2025-01-09 DIAGNOSIS — R55 SYNCOPE AND COLLAPSE: ICD-10-CM

## 2025-01-09 DIAGNOSIS — I49.5 SICK SINUS SYNDROME: ICD-10-CM

## 2025-01-09 DIAGNOSIS — Z95.0 PACEMAKER: ICD-10-CM

## 2025-01-09 DIAGNOSIS — Z95.0 PACEMAKER: Primary | ICD-10-CM

## 2025-01-09 DIAGNOSIS — R00.1 BRADYCARDIA: Primary | ICD-10-CM

## 2025-01-09 DIAGNOSIS — R00.1 BRADYCARDIA: ICD-10-CM

## 2025-07-14 ENCOUNTER — OFFICE VISIT (OUTPATIENT)
Dept: CARDIOLOGY | Facility: CLINIC | Age: 25
End: 2025-07-14
Payer: COMMERCIAL

## 2025-07-14 ENCOUNTER — CLINICAL SUPPORT NO REQUIREMENTS (OUTPATIENT)
Dept: CARDIOLOGY | Facility: CLINIC | Age: 25
End: 2025-07-14
Payer: COMMERCIAL

## 2025-07-14 VITALS
HEIGHT: 70 IN | HEART RATE: 78 BPM | SYSTOLIC BLOOD PRESSURE: 120 MMHG | BODY MASS INDEX: 40.66 KG/M2 | OXYGEN SATURATION: 96 % | DIASTOLIC BLOOD PRESSURE: 78 MMHG | WEIGHT: 284 LBS

## 2025-07-14 DIAGNOSIS — R00.1 BRADYCARDIA: Primary | ICD-10-CM

## 2025-07-14 DIAGNOSIS — R55 SYNCOPE AND COLLAPSE: ICD-10-CM

## 2025-07-14 DIAGNOSIS — Z95.0 PACEMAKER: ICD-10-CM

## 2025-07-14 DIAGNOSIS — Z95.0 STATUS POST PLACEMENT OF CARDIAC PACEMAKER: ICD-10-CM

## 2025-07-14 DIAGNOSIS — I49.5 SICK SINUS SYNDROME: ICD-10-CM

## 2025-07-14 DIAGNOSIS — R00.1 BRADYCARDIA, SINUS: ICD-10-CM

## 2025-07-14 PROCEDURE — 93280 PM DEVICE PROGR EVAL DUAL: CPT | Performed by: INTERNAL MEDICINE

## 2025-07-14 NOTE — PROGRESS NOTES
Encounter Date:07/14/2025  Last seen 1/9/2025.      Patient ID: Kurtis Poon is a 24 y.o. male.    Chief Complaint:  Status post pacemaker  Syncope  Sick sinus syndrome  Sinus bradycardia    History of Present Illness     Since I have last seen, the patient has been without any chest discomfort ,shortness of breath, palpitations, dizziness or syncope.  Denies having any headache ,abdominal pain ,nausea, vomiting , diarrhea constipation, loss of weight or loss of appetite.  Denies having any excessive bruising ,hematuria or blood in the stool.    Review of all systems negative except as indicated.    Reviewed ROS.    Assessment and Plan      ]]]]]]]]]]]]]]]]]]]]]  History  ===========  -Status post permanent dual-chamber pacemaker implantation ([employer] MRI compatible) 6/29/2022  Both leads are active-fixation leads.     - History of syncope-improved-no further episodes   Apparently had 8-second pause while he was in the ER first time.  Patient apparently had seizure activity with first episode.  Possible cardiac seizure.  First episode was approximately a year ago and second episode for 4/3/2022 with following description.     Patient apparently approximately 1 year ago while he was standing in line at department store had syncopal episode starting with lightheadedness.  Patient's bystanders thought he may be having seizure activity with jerking motion while he was on the floor.  EMS were called and was taken to ER and was found to have bradycardia and apparently had 8-second period of asystole in the ER.  Patient apparently has been doing fine since then until recently on 4/3/2022 he had another episode while he was in the restaurant feeling fine and apparently had chipped molar and had terrible pain.  He had similar episode.  Patient was taken to the ER and no specific etiology was found.  Patient did not have any further episodes.     Echocardiogram-4/29/2022-normal     30-day event monitor  4/29/2022  Abnormal Holter monitor with significant sinus bradycardia with heart rates as low as 34/min.  1 episode of second-degree AV block (single cycle) was noted.  Have contacted patient and he is not having any significant symptoms at this time.  Patient has an appointment on 6/2/2022.  Will discuss with him the options including consideration for EP consultation/pacemaker.  Patient to come to the ER if he has any symptoms.     - Status post hernia repair tubes in the ears     -Family history of coronary artery disease     - Former smoker.  Smokes marijuana.  Vaping.     - No known allergies  ============  Plan  ===========  Status post permanent dual-chamber pacemaker implantation ([employer] MRI compatible) 6/29/2022.  Pacemaker site and function is normal.  Interrogation of the pacemaker revealed excellent pacing parameters-7/14/2025.  Battery status is 6.5 years.    EKG sinus rhythm left posterior fascicular block 80/min nonspecific ST-T wave changes no ectopy no significant change from previous EKG.    Past history of syncope due to pacemaker implantation.  No further episodes of syncope or seizure since patient had pacemaker implantation    Follow-up in the office in 6 months with pacemaker interrogation.    Further plan will depend on patient's progress.    Reviewed and updated 7/14/2025.  ]]]]]]]]]]]]]]]]]]       Diagnosis Plan   1. Bradycardia        2. Sick sinus syndrome        3. Pacemaker        4. Syncope and collapse        5. Status post placement of cardiac pacemaker        6. Bradycardia, sinus        LAB RESULTS (LAST 7 DAYS)    CBC        BMP        CMP         BNP        TROPONIN        CoAg        Creatinine Clearance  CrCl cannot be calculated (Patient's most recent lab result is older than the maximum 30 days allowed.).    ABG        Radiology  No radiology results for the last day                The following portions of the patient's history were reviewed and updated as  appropriate: allergies, current medications, past family history, past medical history, past social history, past surgical history, and problem list.    Review of Systems   Constitutional: Negative for malaise/fatigue.   Cardiovascular:  Negative for chest pain, dyspnea on exertion, leg swelling and palpitations.   Respiratory:  Negative for cough and shortness of breath.    Gastrointestinal:  Negative for abdominal pain, nausea and vomiting.   Neurological:  Negative for dizziness, headaches, light-headedness, numbness and weakness.   All other systems reviewed and are negative.      No current outpatient medications on file.    No Known Allergies    Family History   Problem Relation Age of Onset   • Heart disease Father    • Hypertension Maternal Grandmother    • Heart failure Maternal Grandmother    • Heart disease Maternal Grandmother    • Diabetes type I Maternal Grandmother    • Stroke Maternal Grandmother    • Hypertension Maternal Grandfather    • Diabetes type II Maternal Grandfather    • Diabetes type II Paternal Grandmother    • Hypertension Paternal Grandmother    • Heart disease Paternal Grandmother    • Diabetes type II Paternal Grandfather    • Hypertension Paternal Grandfather        Past Surgical History:   Procedure Laterality Date   • CARDIAC ELECTROPHYSIOLOGY PROCEDURE N/A 6/29/2022    Procedure: Pacemaker [facility];  Surgeon: Micki Mane MD;  Location: [facility] INVASIVE LOCATION;  Service: Cardiovascular;  Laterality: N/A;   • EAR TUBES     • HERNIA REPAIR     • TEAR DUCT SURGERY         Past Medical History:   Diagnosis Date   • Bradycardia    • Syncope        Family History   Problem Relation Age of Onset   • Heart disease Father    • Hypertension Maternal Grandmother    • Heart failure Maternal Grandmother    • Heart disease Maternal Grandmother    • Diabetes type I Maternal Grandmother    • Stroke Maternal Grandmother    • Hypertension Maternal Grandfather    • Diabetes  "type II Maternal Grandfather    • Diabetes type II Paternal Grandmother    • Hypertension Paternal Grandmother    • Heart disease Paternal Grandmother    • Diabetes type II Paternal Grandfather    • Hypertension Paternal Grandfather        Social History     Socioeconomic History   • Marital status: Single   Tobacco Use   • Smoking status: Never     Passive exposure: Never   • Smokeless tobacco: Never   Vaping Use   • Vaping status: Former   • Substances: Nicotine, Flavoring   • Devices: Refillable tank   Substance and Sexual Activity   • Alcohol use: Not Currently   • Drug use: Yes     Frequency: 7.0 times per week     Types: Marijuana     Comment: once a day   • Sexual activity: Defer           ECG 12 Lead    Date/Time: 7/15/2025 4:32 PM  Performed by: Micki Mane MD    Authorized by: Micki Mane MD  Comparison: compared with previous ECG   Similar to previous ECG  Comparison to previous ECG: Normal sinus rhythm right axis deviation 80/min normal intervals no ectopy no significant change from previous EKG.        Objective:       Physical Exam    /78 (BP Location: Left arm, Patient Position: Sitting, Cuff Size: Adult)   Pulse 78   Ht 177.8 cm (70\")   Wt 129 kg (284 lb)   SpO2 96%   BMI 40.75 kg/m²   The patient is alert, oriented and in no distress.    Vital signs as noted above.  Obesity (BMI of 41)    Head and neck revealed no carotid bruits or jugular venous distension.  No thyromegaly or lymphadenopathy is present.    Lungs clear.  No wheezing.  Breath sounds are normal bilaterally.    Heart normal first and second heart sounds.  No murmur..  No pericardial rub is present.  No gallop is present.    Abdomen soft and nontender.  No organomegaly is present.    Extremities revealed good peripheral pulses without any pedal edema.    Skin warm and dry.  Pacemaker site looks normal.    Musculoskeletal system is grossly normal.    CNS grossly normal.    Reviewed and updated.        "

## (undated) DEVICE — DRSNG WND BORDR/ADHS NONADHR/GZ LF 4X4IN STRL

## (undated) DEVICE — CABL BIPOL W/ALLGTR CLIP/SM 12FT

## (undated) DEVICE — ADHS LIQ MASTISOL 2/3ML

## (undated) DEVICE — VIOLET BRAIDED (POLYGLACTIN 910), SYNTHETIC ABSORBABLE SUTURE: Brand: COATED VICRYL

## (undated) DEVICE — ELECTRD DEFIB M/FUNC PROPADZ RADIOL 2PK

## (undated) DEVICE — 3M™ STERI-STRIP™ REINFORCED ADHESIVE SKIN CLOSURES, R1547, 1/2 IN X 4 IN (12 MM X 100 MM), 6 STRIPS/ENVELOPE: Brand: 3M™ STERI-STRIP™

## (undated) DEVICE — SUT SILK 2/0 SH CR8 18IN CR8 C012D

## (undated) DEVICE — PACEMAKER CDS: Brand: MEDLINE INDUSTRIES, INC.

## (undated) DEVICE — INTRO SHEATH PRELUDE SNAP .038 6F 13CM W/SDPRT

## (undated) DEVICE — UNDYED BRAIDED (POLYGLACTIN 910), SYNTHETIC ABSORBABLE SUTURE: Brand: COATED VICRYL

## (undated) DEVICE — TBG PENCL TELESCP MEGADYNE SMOKE EVAC 10FT

## (undated) DEVICE — INTRO SHEATH PRELUDE SNAP .038 9F 13CM W/SDPRT BLK

## (undated) DEVICE — NDL PERC 1PRT THNWALL W/BASEPLT 18G 7CM

## (undated) DEVICE — 3M™ PATIENT PLATE, CORDED, SPLIT, LARGE, 40 PER CASE, 1179: Brand: 3M™